# Patient Record
Sex: FEMALE | Race: OTHER | HISPANIC OR LATINO | URBAN - METROPOLITAN AREA
[De-identification: names, ages, dates, MRNs, and addresses within clinical notes are randomized per-mention and may not be internally consistent; named-entity substitution may affect disease eponyms.]

---

## 2018-03-01 ENCOUNTER — OUTPATIENT (OUTPATIENT)
Dept: OUTPATIENT SERVICES | Facility: HOSPITAL | Age: 48
LOS: 1 days | Discharge: HOME | End: 2018-03-01

## 2018-03-05 DIAGNOSIS — K02.63 DENTAL CARIES ON SMOOTH SURFACE PENETRATING INTO PULP: ICD-10-CM

## 2020-05-10 ENCOUNTER — EMERGENCY (EMERGENCY)
Facility: HOSPITAL | Age: 50
LOS: 0 days | Discharge: HOME | End: 2020-05-10
Attending: STUDENT IN AN ORGANIZED HEALTH CARE EDUCATION/TRAINING PROGRAM | Admitting: STUDENT IN AN ORGANIZED HEALTH CARE EDUCATION/TRAINING PROGRAM
Payer: SUBSIDIZED

## 2020-05-10 VITALS
DIASTOLIC BLOOD PRESSURE: 56 MMHG | OXYGEN SATURATION: 96 % | RESPIRATION RATE: 18 BRPM | TEMPERATURE: 100 F | SYSTOLIC BLOOD PRESSURE: 96 MMHG | HEART RATE: 92 BPM

## 2020-05-10 VITALS
OXYGEN SATURATION: 100 % | TEMPERATURE: 98 F | RESPIRATION RATE: 18 BRPM | HEART RATE: 114 BPM | SYSTOLIC BLOOD PRESSURE: 126 MMHG | DIASTOLIC BLOOD PRESSURE: 58 MMHG

## 2020-05-10 DIAGNOSIS — R00.0 TACHYCARDIA, UNSPECIFIED: ICD-10-CM

## 2020-05-10 DIAGNOSIS — R10.9 UNSPECIFIED ABDOMINAL PAIN: ICD-10-CM

## 2020-05-10 DIAGNOSIS — Z87.891 PERSONAL HISTORY OF NICOTINE DEPENDENCE: ICD-10-CM

## 2020-05-10 DIAGNOSIS — R51 HEADACHE: ICD-10-CM

## 2020-05-10 DIAGNOSIS — N12 TUBULO-INTERSTITIAL NEPHRITIS, NOT SPECIFIED AS ACUTE OR CHRONIC: ICD-10-CM

## 2020-05-10 LAB
ALBUMIN SERPL ELPH-MCNC: 4.4 G/DL — SIGNIFICANT CHANGE UP (ref 3.5–5.2)
ALP SERPL-CCNC: 70 U/L — SIGNIFICANT CHANGE UP (ref 30–115)
ALT FLD-CCNC: 33 U/L — SIGNIFICANT CHANGE UP (ref 0–41)
ANION GAP SERPL CALC-SCNC: 16 MMOL/L — HIGH (ref 7–14)
ANISOCYTOSIS BLD QL: SIGNIFICANT CHANGE UP
APPEARANCE UR: CLEAR — SIGNIFICANT CHANGE UP
AST SERPL-CCNC: 49 U/L — HIGH (ref 0–41)
BACTERIA # UR AUTO: ABNORMAL
BASE EXCESS BLDV CALC-SCNC: -0.4 MMOL/L — SIGNIFICANT CHANGE UP (ref -2–2)
BASOPHILS # BLD AUTO: 0 K/UL — SIGNIFICANT CHANGE UP (ref 0–0.2)
BASOPHILS NFR BLD AUTO: 0 % — SIGNIFICANT CHANGE UP (ref 0–1)
BILIRUB SERPL-MCNC: 0.7 MG/DL — SIGNIFICANT CHANGE UP (ref 0.2–1.2)
BILIRUB UR-MCNC: NEGATIVE — SIGNIFICANT CHANGE UP
BUN SERPL-MCNC: 12 MG/DL — SIGNIFICANT CHANGE UP (ref 10–20)
CA-I SERPL-SCNC: 1.09 MMOL/L — LOW (ref 1.12–1.3)
CALCIUM SERPL-MCNC: 8.9 MG/DL — SIGNIFICANT CHANGE UP (ref 8.5–10.1)
CHLORIDE SERPL-SCNC: 94 MMOL/L — LOW (ref 98–110)
CO2 SERPL-SCNC: 22 MMOL/L — SIGNIFICANT CHANGE UP (ref 17–32)
COLOR SPEC: SIGNIFICANT CHANGE UP
CREAT SERPL-MCNC: 0.6 MG/DL — LOW (ref 0.7–1.5)
DIFF PNL FLD: SIGNIFICANT CHANGE UP
EOSINOPHIL # BLD AUTO: 0 K/UL — SIGNIFICANT CHANGE UP (ref 0–0.7)
EOSINOPHIL NFR BLD AUTO: 0 % — SIGNIFICANT CHANGE UP (ref 0–8)
EPI CELLS # UR: 1 /HPF — SIGNIFICANT CHANGE UP (ref 0–5)
GAS PNL BLDV: 135 MMOL/L — LOW (ref 136–145)
GAS PNL BLDV: SIGNIFICANT CHANGE UP
GIANT PLATELETS BLD QL SMEAR: PRESENT — SIGNIFICANT CHANGE UP
GLUCOSE SERPL-MCNC: 187 MG/DL — HIGH (ref 70–99)
GLUCOSE UR QL: NEGATIVE — SIGNIFICANT CHANGE UP
HCG SERPL QL: NEGATIVE — SIGNIFICANT CHANGE UP
HCO3 BLDV-SCNC: 24 MMOL/L — SIGNIFICANT CHANGE UP (ref 22–29)
HCT VFR BLD CALC: 28.4 % — LOW (ref 37–47)
HCT VFR BLDA CALC: 21.1 % — LOW (ref 34–44)
HGB BLD CALC-MCNC: 6.9 G/DL — LOW (ref 14–18)
HGB BLD-MCNC: 8 G/DL — LOW (ref 12–16)
HYALINE CASTS # UR AUTO: 1 /LPF — SIGNIFICANT CHANGE UP (ref 0–7)
HYPOCHROMIA BLD QL: SLIGHT — SIGNIFICANT CHANGE UP
KETONES UR-MCNC: ABNORMAL
LACTATE BLDV-MCNC: 1 MMOL/L — SIGNIFICANT CHANGE UP (ref 0.5–1.6)
LACTATE SERPL-SCNC: 2.3 MMOL/L — HIGH (ref 0.7–2)
LEUKOCYTE ESTERASE UR-ACNC: ABNORMAL
LYMPHOCYTES # BLD AUTO: 0.52 K/UL — LOW (ref 1.2–3.4)
LYMPHOCYTES # BLD AUTO: 7 % — LOW (ref 20.5–51.1)
MANUAL SMEAR VERIFICATION: SIGNIFICANT CHANGE UP
MCHC RBC-ENTMCNC: 18.2 PG — LOW (ref 27–31)
MCHC RBC-ENTMCNC: 28.2 G/DL — LOW (ref 32–37)
MCV RBC AUTO: 64.7 FL — LOW (ref 81–99)
MICROCYTES BLD QL: SIGNIFICANT CHANGE UP
MONOCYTES # BLD AUTO: 0.32 K/UL — SIGNIFICANT CHANGE UP (ref 0.1–0.6)
MONOCYTES NFR BLD AUTO: 4.3 % — SIGNIFICANT CHANGE UP (ref 1.7–9.3)
NEUTROPHILS # BLD AUTO: 6.52 K/UL — HIGH (ref 1.4–6.5)
NEUTROPHILS NFR BLD AUTO: 80.9 % — HIGH (ref 42.2–75.2)
NEUTS BAND # BLD: 6.1 % — HIGH (ref 0–6)
NITRITE UR-MCNC: POSITIVE
NRBC # BLD: 1 /100 — HIGH (ref 0–0)
NRBC # BLD: SIGNIFICANT CHANGE UP /100 WBCS (ref 0–0)
PCO2 BLDV: 35 MMHG — LOW (ref 41–51)
PH BLDV: 7.44 — HIGH (ref 7.26–7.43)
PH UR: 7 — SIGNIFICANT CHANGE UP (ref 5–8)
PLAT MORPH BLD: ABNORMAL
PLATELET # BLD AUTO: 300 K/UL — SIGNIFICANT CHANGE UP (ref 130–400)
PO2 BLDV: 28 MMHG — SIGNIFICANT CHANGE UP (ref 20–40)
POIKILOCYTOSIS BLD QL AUTO: SIGNIFICANT CHANGE UP
POLYCHROMASIA BLD QL SMEAR: SLIGHT — SIGNIFICANT CHANGE UP
POTASSIUM BLDV-SCNC: 3.2 MMOL/L — LOW (ref 3.3–5.6)
POTASSIUM SERPL-MCNC: 3.5 MMOL/L — SIGNIFICANT CHANGE UP (ref 3.5–5)
POTASSIUM SERPL-SCNC: 3.5 MMOL/L — SIGNIFICANT CHANGE UP (ref 3.5–5)
PROT SERPL-MCNC: 8 G/DL — SIGNIFICANT CHANGE UP (ref 6–8)
PROT UR-MCNC: ABNORMAL
RBC # BLD: 4.39 M/UL — SIGNIFICANT CHANGE UP (ref 4.2–5.4)
RBC # FLD: 20.7 % — HIGH (ref 11.5–14.5)
RBC BLD AUTO: ABNORMAL
RBC CASTS # UR COMP ASSIST: 3 /HPF — SIGNIFICANT CHANGE UP (ref 0–4)
SAO2 % BLDV: 52 % — SIGNIFICANT CHANGE UP
SODIUM SERPL-SCNC: 132 MMOL/L — LOW (ref 135–146)
SP GR SPEC: >1.05 (ref 1.01–1.02)
UROBILINOGEN FLD QL: SIGNIFICANT CHANGE UP
VARIANT LYMPHS # BLD: 1.7 % — SIGNIFICANT CHANGE UP (ref 0–5)
WBC # BLD: 7.49 K/UL — SIGNIFICANT CHANGE UP (ref 4.8–10.8)
WBC # FLD AUTO: 7.49 K/UL — SIGNIFICANT CHANGE UP (ref 4.8–10.8)
WBC UR QL: 40 /HPF — HIGH (ref 0–5)

## 2020-05-10 PROCEDURE — 74177 CT ABD & PELVIS W/CONTRAST: CPT | Mod: 26

## 2020-05-10 PROCEDURE — 99285 EMERGENCY DEPT VISIT HI MDM: CPT

## 2020-05-10 PROCEDURE — 93010 ELECTROCARDIOGRAM REPORT: CPT

## 2020-05-10 RX ORDER — ONDANSETRON 8 MG/1
4 TABLET, FILM COATED ORAL ONCE
Refills: 0 | Status: COMPLETED | OUTPATIENT
Start: 2020-05-10 | End: 2020-05-10

## 2020-05-10 RX ORDER — CEFTRIAXONE 500 MG/1
1000 INJECTION, POWDER, FOR SOLUTION INTRAMUSCULAR; INTRAVENOUS ONCE
Refills: 0 | Status: COMPLETED | OUTPATIENT
Start: 2020-05-10 | End: 2020-05-10

## 2020-05-10 RX ORDER — KETOROLAC TROMETHAMINE 30 MG/ML
15 SYRINGE (ML) INJECTION ONCE
Refills: 0 | Status: DISCONTINUED | OUTPATIENT
Start: 2020-05-10 | End: 2020-05-10

## 2020-05-10 RX ORDER — ACETAMINOPHEN 500 MG
650 TABLET ORAL ONCE
Refills: 0 | Status: COMPLETED | OUTPATIENT
Start: 2020-05-10 | End: 2020-05-10

## 2020-05-10 RX ORDER — CEFPODOXIME PROXETIL 100 MG
1 TABLET ORAL
Qty: 28 | Refills: 0
Start: 2020-05-10 | End: 2020-05-23

## 2020-05-10 RX ORDER — SODIUM CHLORIDE 9 MG/ML
1500 INJECTION, SOLUTION INTRAVENOUS ONCE
Refills: 0 | Status: COMPLETED | OUTPATIENT
Start: 2020-05-10 | End: 2020-05-10

## 2020-05-10 RX ORDER — SODIUM CHLORIDE 9 MG/ML
1000 INJECTION, SOLUTION INTRAVENOUS ONCE
Refills: 0 | Status: COMPLETED | OUTPATIENT
Start: 2020-05-10 | End: 2020-05-10

## 2020-05-10 RX ADMIN — ONDANSETRON 4 MILLIGRAM(S): 8 TABLET, FILM COATED ORAL at 14:27

## 2020-05-10 RX ADMIN — SODIUM CHLORIDE 1500 MILLILITER(S): 9 INJECTION, SOLUTION INTRAVENOUS at 15:53

## 2020-05-10 RX ADMIN — Medication 650 MILLIGRAM(S): at 15:54

## 2020-05-10 RX ADMIN — CEFTRIAXONE 100 MILLIGRAM(S): 500 INJECTION, POWDER, FOR SOLUTION INTRAMUSCULAR; INTRAVENOUS at 14:26

## 2020-05-10 RX ADMIN — SODIUM CHLORIDE 1000 MILLILITER(S): 9 INJECTION, SOLUTION INTRAVENOUS at 14:27

## 2020-05-10 RX ADMIN — Medication 15 MILLIGRAM(S): at 14:25

## 2020-05-10 NOTE — ED PROVIDER NOTE - ATTENDING CONTRIBUTION TO CARE
49 y./o F   No reported pmh  p/w L flank pain w/ rad to LLQ x 3d  + fever, chills, nausea, dysuria, frequency.  No cough, cp.  ROS PE above.  IMP: Renal stone vs uti/ pyelo  P: labs, IVF, abx, CT reassess.

## 2020-05-10 NOTE — ED ADULT NURSE NOTE - NSIMPLEMENTINTERV_GEN_ALL_ED
Implemented All Universal Safety Interventions:  [place] to call system. Call bell, personal items and telephone within reach. Instruct patient to call for assistance. Room bathroom lighting operational. Non-slip footwear when patient is off stretcher. Physically safe environment: no spills, clutter or unnecessary equipment. Stretcher in lowest position, wheels locked, appropriate side rails in place.

## 2020-05-10 NOTE — ED PROVIDER NOTE - NS ED ROS FT
GEN:  + fever, + chills  NEURO:  + headache, no dizziness  ENT: no sore throat, no runny nose  CV:  no chest pain, no palpitations  RESP:  no sob, no cough  GI:  + nausea, no vomiting, + abdominal pain, no diarrhea  :  + dysuria, + urinary frequency, + hematuria  MSK:  no joint pain, no edema  SKIN:  no rash, no bruising  HEME: no hematochezia, no melena 31 yo male with atypical CP yesterday, asymptomatic here , placed in EDOU for cardiac work up, stress test and troponin negative.

## 2020-05-10 NOTE — ED ADULT NURSE NOTE - CHIEF COMPLAINT QUOTE
Abdominal pain, painful urination, difficulty urinating, fever x 3 days. Denies SOB and cough. Pt is hard of hearing, Omani speaking, daughter phone number is [phone] (Radha Olvera).

## 2020-05-10 NOTE — ED PROVIDER NOTE - NSFOLLOWUPCLINICS_GEN_ALL_ED_FT
[facility]  Medicine  [address], [place]   Phone: [phone]  Fax:   Follow Up Time:

## 2020-05-10 NOTE — ED PROVIDER NOTE - OBJECTIVE STATEMENT
48 yo F with no PMHx who presents with gradual worsening of constant, severe, L lower flank pain radiating to LLQ x 3 days associated with fever Tmax 100.9, chills, nausea, dysuria, urinary frequency. Sx transiently improved with tylenol, last taken 4 hrs prior to arrival. Had small drops of blood in urine which resolved on its own. No vomiting, cp, sob, diarrhea. LMP 5 days ago.

## 2020-05-10 NOTE — ED PROVIDER NOTE - CLINICAL SUMMARY MEDICAL DECISION MAKING FREE TEXT BOX
Pt w/ fever/ chills abd pain urinary sx. labs CT reviewed, c/w pyelonephritis. pain resolved. pt felt much better. got abx ivf. enrolled for telehealth f/up. Discussed all available results.  Pt and/ or family understand results, plan of care, abx use, telehealth follow up as discussed, and signs and symptoms for ED return.  Pt/ family is comfortable with discharge. dc home.

## 2020-05-10 NOTE — ED ADULT TRIAGE NOTE - CHIEF COMPLAINT QUOTE
Abdominal pain, painful urination, difficulty urinating, fever x 3 days. Denies SOB and cough. Pt is hard of hearing, Sammarinese speaking, daughter phone number is [phone] (Radha Olvera).

## 2020-05-10 NOTE — ED PROVIDER NOTE - NSFOLLOWUPINSTRUCTIONS_ED_ALL_ED_FT
Infección del riñón    LO QUE NECESITA SABER:    ¿Qué es zacarias infección del riñón?Zacarias infección de riñón, o pielonefritis es zacarias infección bacteriana. La infección usualmente comienza en roque vejiga o uretra y se pasa a roque riñón. Lian o ambos riñones podrían estar infectados. Riñón, uréteres, vejiga         ¿Qué aumenta mi riesgo de contraer zacarias infección de riñón?    Un historial de infecciones del tracto urinario      Zacarias sonda vesical permanente      Flujo de orina obstruido u orina que fluye de regreso de roque uretra      Embarazo      Condiciones médicas ivone la diabetes o piedras en los riñones    ¿Cuáles son los signos y síntomas de zacarias infección de riñón?    Dolor en el abdomen, en la parte inferior de la espalda o en los costados      Dolor o ardor al orinar      Impulso repentino y jose de orinar u orinar con más frecuencia de lo normal      Orina turbia o con jasiel      Fiebre, escalofríos y fatiga      Náuseas y vómitos    ¿Cómo se diagnostica zacarias infección de riñón?Roque médico le preguntará acerca de renea síntomas. Él también le preguntará si usted tiene otras condiciones de deb. Los exámenes de jasiel y de orina mostrarán roque infección. Podrían realizarle un ultrasonido para mostrar zacarias infección, un absceso u otros problemas en renea riñones.    ¿Cómo se trata zacarias infección del riñón?    Los antibióticostratan roque infección bacteriana.      Acetaminofénalivia el dolor y baja la fiebre. Está disponible sin receta médica. Pregunte la cantidad y la frecuencia con que debe tomarlos. Siga las indicaciones. Colleen las etiquetas de todos los demás medicamentos que esté usando para saber si también contienen acetaminofén, o pregunte a roque médico o farmacéutico. El acetaminofén puede causar daño en el hígado cuando no se david de forma correcta. No use más de 4 gramos (4000 miligramos) en total de acetaminofeno en un día.      Los ROD,ivone el ibuprofeno, ayudan a disminuir la inflamación, el dolor y la fiebre. Bella medicamento está disponible con o sin zacarias receta médica. Los ROD pueden causar sangrado estomacal o problemas renales en ciertas personas. Si usted esta tomando un anticoágulante, siempre pregunte si los AINEs son seguros para usted. Siempre colleen la etiqueta de bella medicamento y siga las instrucciones. No administre bella medicamento a niños menores de 6 meses de pierre sin antes obtener la autorización de roque médico.      Puede administrarsepodrían administrarse. Pregunte cómo francois estos medicamentos de zacarias forma eric.      La cirugíapodría ser necesaria si lian de los uréteres está obstruido. Los uréteres son los conductos que llevan la orina de los riñones a la vejiga. Si lian de los uréteres está bloqueado, podría provocar infecciones recurrentes en los riñones.    ¿Cómo puedo controlar los síntomas?    [place] líquidos ivone se le haya indicado.Es posible que usted necesite francois líquidos adicionales para ayudar a limpiar renea riñones y roque sistema urinario. El agua es el mejor líquido para francois. Pregunte a roque médico sobre la cantidad de líquido que necesita francois todos los yevgeniy y cuáles le recomienda.      Orine neves pronto ivone sienta la necesidad de hacerlo.[place] ayudará a eliminar las bacterias de roque sistema urinario. No espere ni aguante roque orina por demasiado tiempo.      Limpie el área de los genitales a diario con agua y jabón.Límpiese de adelante hacia atrás después de orinar o de tener zacarias evacuación intestinal. Use ropa interior de algodón. Las telas ivone el nylon y el poliéster pueden permanecer húmedas. [place] puede aumentar roque riesgo de zacarias infección. Orine dentro de 15 minutos después de caterina tenido relaciones sexuales.    ¿Cuándo seelna buscar atención inmediata?    Usted tiene fiebre y escalofríos.      Usted no puede dejar de vomitar.      Usted tiene dolor intenso en el abdomen, en la parte inferior de la espalda o en los costados.    ¿Cuándo selena comunicarme con mi médico?    Usted continúa teniendo fiebre después de francois los antibióticos por 3 días.      Usted siente dolor al orinar, incluso después del tratamiento.      Renea signos y síntomas regresan.      Usted tiene preguntas o inquietudes acerca de roque condición o cuidado.    ACUERDOS SOBRE ROQUE CUIDADO:    Usted tiene el derecho de ayudar a planear roque cuidado. Aprenda todo lo que pueda sobre roque condición y ivone darle tratamiento. Discuta renea opciones de tratamiento con renea médicos para decidir el cuidado que usted desea recibir. Usted siempre tiene el derecho de rechazar el tratamiento.       © Copyright [employer] 2020

## 2020-05-10 NOTE — ED PROVIDER NOTE - PATIENT PORTAL LINK FT
You can access the FollowMyHealth Patient Portal offered by [facility] by registering at the following website: [email or web address]. By joining [employer]’s FollowMyHealth portal, you will also be able to view your health information using other applications (apps) compatible with our system.

## 2020-05-10 NOTE — ED ADULT NURSE NOTE - OBJECTIVE STATEMENT
Pt with C/O  pain on urination left side abdomen pain for 3 days worse today N/V on and offf ,fever ,pt denies diarrhea or constipation

## 2020-05-10 NOTE — ED PROVIDER NOTE - PROGRESS NOTE DETAILS
TC: 50 yo F TC: Previously healthy 50 yo F who presents with L flank pain and urinary sx x 3 days. Had fever, chills, nausea at home. Here in ED, tachy 110s, oral temp wnl. Abd nontender on exam. Ordered labs, ekg, urine, CT abd. Given IVF, ceftriaxone, tylenol. Will reassess. TC: Previously healthy 50 yo F who presents with L flank pain and urinary sx x 3 days. Had fever, chills, nausea at home. Here in ED, tachy 110s, oral temp wnl, will check rectal temp Abd nontender on exam. Ordered labs, ekg, urine, CT abd. Given IVF, ceftriaxone, tylenol. Will reassess. TC: Reassessed pt, reports pain improved after toradol. Labs notable for hgb 8 (unknown baseline), lactate 2.3. Pending urine sample, CT, repeat lactate. TC: Repeat lactate improved to 1. Pt taken to CT. Pt feels better. TC: Pt reports pain improved. HR improved. BP stable. CT shows large fibroid mass with mass effect on bladder. No evidence of hydro or nephrolithiasis. UA grossly positive. Rx for vantin sent to pharmacy. Pt enrolled into telehealth f/u. Strict ED return precautions given. Pt verbalized understanding and was agreeable with plan. TC: Pt reports pain improved. HR improved. BP stable. CT shows large fibroid mass with mass effect on bladder. No evidence of hydro or nephrolithiasis. UA grossly positive. Rx for vantin sent to pharmacy. Pt enrolled into telehealth f/u. Given medicine clinic f/u. Strict ED return precautions given. Pt verbalized understanding and was agreeable with plan.

## 2020-05-12 LAB
CULTURE RESULTS: SIGNIFICANT CHANGE UP
SPECIMEN SOURCE: SIGNIFICANT CHANGE UP

## 2020-05-15 LAB
CULTURE RESULTS: SIGNIFICANT CHANGE UP
SPECIMEN SOURCE: SIGNIFICANT CHANGE UP

## 2020-09-09 ENCOUNTER — INPATIENT (INPATIENT)
Facility: HOSPITAL | Age: 50
LOS: 3 days | Discharge: HOME | End: 2020-09-13
Attending: INTERNAL MEDICINE | Admitting: INTERNAL MEDICINE
Payer: MEDICAID

## 2020-09-09 VITALS
RESPIRATION RATE: 18 BRPM | WEIGHT: 119.93 LBS | TEMPERATURE: 99 F | HEART RATE: 91 BPM | HEIGHT: 61 IN | SYSTOLIC BLOOD PRESSURE: 118 MMHG | DIASTOLIC BLOOD PRESSURE: 75 MMHG

## 2020-09-09 DIAGNOSIS — I26.99 OTHER PULMONARY EMBOLISM WITHOUT ACUTE COR PULMONALE: ICD-10-CM

## 2020-09-09 DIAGNOSIS — R09.89 OTHER SPECIFIED SYMPTOMS AND SIGNS INVOLVING THE CIRCULATORY AND RESPIRATORY SYSTEMS: ICD-10-CM

## 2020-09-09 LAB
ALBUMIN SERPL ELPH-MCNC: 4.2 G/DL — SIGNIFICANT CHANGE UP (ref 3.5–5.2)
ALP SERPL-CCNC: 86 U/L — SIGNIFICANT CHANGE UP (ref 30–115)
ALT FLD-CCNC: 27 U/L — SIGNIFICANT CHANGE UP (ref 0–41)
ANION GAP SERPL CALC-SCNC: 14 MMOL/L — SIGNIFICANT CHANGE UP (ref 7–14)
ANISOCYTOSIS BLD QL: SIGNIFICANT CHANGE UP
APPEARANCE UR: CLEAR — SIGNIFICANT CHANGE UP
APTT BLD: 25.3 SEC — LOW (ref 27–39.2)
APTT BLD: SIGNIFICANT CHANGE UP SEC (ref 27–39.2)
AST SERPL-CCNC: 59 U/L — HIGH (ref 0–41)
BACTERIA # UR AUTO: ABNORMAL
BASOPHILS # BLD AUTO: 0 K/UL — SIGNIFICANT CHANGE UP (ref 0–0.2)
BASOPHILS NFR BLD AUTO: 0 % — SIGNIFICANT CHANGE UP (ref 0–1)
BILIRUB SERPL-MCNC: 0.5 MG/DL — SIGNIFICANT CHANGE UP (ref 0.2–1.2)
BILIRUB UR-MCNC: NEGATIVE — SIGNIFICANT CHANGE UP
BLD GP AB SCN SERPL QL: SIGNIFICANT CHANGE UP
BUN SERPL-MCNC: 9 MG/DL — LOW (ref 10–20)
CALCIUM SERPL-MCNC: 9.4 MG/DL — SIGNIFICANT CHANGE UP (ref 8.5–10.1)
CHLORIDE SERPL-SCNC: 94 MMOL/L — LOW (ref 98–110)
CO2 SERPL-SCNC: 23 MMOL/L — SIGNIFICANT CHANGE UP (ref 17–32)
COLOR SPEC: SIGNIFICANT CHANGE UP
CREAT SERPL-MCNC: 0.6 MG/DL — LOW (ref 0.7–1.5)
DIFF PNL FLD: NEGATIVE — SIGNIFICANT CHANGE UP
EOSINOPHIL # BLD AUTO: 0.15 K/UL — SIGNIFICANT CHANGE UP (ref 0–0.7)
EOSINOPHIL NFR BLD AUTO: 2.6 % — SIGNIFICANT CHANGE UP (ref 0–8)
EPI CELLS # UR: 1 /HPF — SIGNIFICANT CHANGE UP (ref 0–5)
GIANT PLATELETS BLD QL SMEAR: PRESENT — SIGNIFICANT CHANGE UP
GLUCOSE SERPL-MCNC: 472 MG/DL — CRITICAL HIGH (ref 70–99)
GLUCOSE UR QL: ABNORMAL
HCG SERPL QL: NEGATIVE — SIGNIFICANT CHANGE UP
HCT VFR BLD CALC: 28.9 % — LOW (ref 37–47)
HGB BLD-MCNC: 8 G/DL — LOW (ref 12–16)
HYALINE CASTS # UR AUTO: 0 /LPF — SIGNIFICANT CHANGE UP (ref 0–7)
INR BLD: 1.1 RATIO — SIGNIFICANT CHANGE UP (ref 0.65–1.3)
INR BLD: 1.15 RATIO — SIGNIFICANT CHANGE UP (ref 0.65–1.3)
KETONES UR-MCNC: SIGNIFICANT CHANGE UP
LACTATE SERPL-SCNC: 1.5 MMOL/L — SIGNIFICANT CHANGE UP (ref 0.7–2)
LACTATE SERPL-SCNC: 2.9 MMOL/L — HIGH (ref 0.7–2)
LEUKOCYTE ESTERASE UR-ACNC: NEGATIVE — SIGNIFICANT CHANGE UP
LIDOCAIN IGE QN: 35 U/L — SIGNIFICANT CHANGE UP (ref 7–60)
LYMPHOCYTES # BLD AUTO: 1.03 K/UL — LOW (ref 1.2–3.4)
LYMPHOCYTES # BLD AUTO: 17.4 % — LOW (ref 20.5–51.1)
MAGNESIUM SERPL-MCNC: 2.1 MG/DL — SIGNIFICANT CHANGE UP (ref 1.8–2.4)
MANUAL SMEAR VERIFICATION: SIGNIFICANT CHANGE UP
MCHC RBC-ENTMCNC: 19.4 PG — LOW (ref 27–31)
MCHC RBC-ENTMCNC: 27.7 G/DL — LOW (ref 32–37)
MCV RBC AUTO: 70.1 FL — LOW (ref 81–99)
MICROCYTES BLD QL: SIGNIFICANT CHANGE UP
MONOCYTES # BLD AUTO: 0.41 K/UL — SIGNIFICANT CHANGE UP (ref 0.1–0.6)
MONOCYTES NFR BLD AUTO: 6.9 % — SIGNIFICANT CHANGE UP (ref 1.7–9.3)
MYELOCYTES NFR BLD: 0.9 % — HIGH (ref 0–0)
NEUTROPHILS # BLD AUTO: 4.11 K/UL — SIGNIFICANT CHANGE UP (ref 1.4–6.5)
NEUTROPHILS NFR BLD AUTO: 69.6 % — SIGNIFICANT CHANGE UP (ref 42.2–75.2)
NITRITE UR-MCNC: POSITIVE
NRBC # BLD: 1 /100 — HIGH (ref 0–0)
NRBC # BLD: SIGNIFICANT CHANGE UP /100 WBCS (ref 0–0)
NT-PROBNP SERPL-SCNC: 45 PG/ML — SIGNIFICANT CHANGE UP (ref 0–300)
PH UR: 6.5 — SIGNIFICANT CHANGE UP (ref 5–8)
PLAT MORPH BLD: NORMAL — SIGNIFICANT CHANGE UP
PLATELET # BLD AUTO: 288 K/UL — SIGNIFICANT CHANGE UP (ref 130–400)
POIKILOCYTOSIS BLD QL AUTO: SLIGHT — SIGNIFICANT CHANGE UP
POLYCHROMASIA BLD QL SMEAR: SLIGHT — SIGNIFICANT CHANGE UP
POTASSIUM SERPL-MCNC: 4.9 MMOL/L — SIGNIFICANT CHANGE UP (ref 3.5–5)
POTASSIUM SERPL-SCNC: 4.9 MMOL/L — SIGNIFICANT CHANGE UP (ref 3.5–5)
PROT SERPL-MCNC: 7.7 G/DL — SIGNIFICANT CHANGE UP (ref 6–8)
PROT UR-MCNC: NEGATIVE — SIGNIFICANT CHANGE UP
PROTHROM AB SERPL-ACNC: 12.7 SEC — SIGNIFICANT CHANGE UP (ref 9.95–12.87)
PROTHROM AB SERPL-ACNC: 13.2 SEC — HIGH (ref 9.95–12.87)
RBC # BLD: 4.12 M/UL — LOW (ref 4.2–5.4)
RBC # FLD: 24.9 % — HIGH (ref 11.5–14.5)
RBC BLD AUTO: ABNORMAL
RBC CASTS # UR COMP ASSIST: 1 /HPF — SIGNIFICANT CHANGE UP (ref 0–4)
SODIUM SERPL-SCNC: 131 MMOL/L — LOW (ref 135–146)
SP GR SPEC: 1.03 — HIGH (ref 1.01–1.02)
TROPONIN T SERPL-MCNC: <0.01 NG/ML — SIGNIFICANT CHANGE UP
UROBILINOGEN FLD QL: SIGNIFICANT CHANGE UP
VARIANT LYMPHS # BLD: 2.6 % — SIGNIFICANT CHANGE UP (ref 0–5)
WBC # BLD: 5.91 K/UL — SIGNIFICANT CHANGE UP (ref 4.8–10.8)
WBC # FLD AUTO: 5.91 K/UL — SIGNIFICANT CHANGE UP (ref 4.8–10.8)
WBC UR QL: 3 /HPF — SIGNIFICANT CHANGE UP (ref 0–5)

## 2020-09-09 PROCEDURE — 71045 X-RAY EXAM CHEST 1 VIEW: CPT | Mod: 26

## 2020-09-09 PROCEDURE — 93010 ELECTROCARDIOGRAM REPORT: CPT

## 2020-09-09 PROCEDURE — 74177 CT ABD & PELVIS W/CONTRAST: CPT | Mod: 26

## 2020-09-09 PROCEDURE — 99285 EMERGENCY DEPT VISIT HI MDM: CPT

## 2020-09-09 PROCEDURE — 99222 1ST HOSP IP/OBS MODERATE 55: CPT | Mod: AI,GC

## 2020-09-09 RX ORDER — ENOXAPARIN SODIUM 100 MG/ML
60 INJECTION SUBCUTANEOUS ONCE
Refills: 0 | Status: COMPLETED | OUTPATIENT
Start: 2020-09-09 | End: 2020-09-09

## 2020-09-09 RX ORDER — CIPROFLOXACIN LACTATE 400MG/40ML
400 VIAL (ML) INTRAVENOUS EVERY 12 HOURS
Refills: 0 | Status: DISCONTINUED | OUTPATIENT
Start: 2020-09-09 | End: 2020-09-11

## 2020-09-09 RX ORDER — CIPROFLOXACIN LACTATE 400MG/40ML
400 VIAL (ML) INTRAVENOUS ONCE
Refills: 0 | Status: COMPLETED | OUTPATIENT
Start: 2020-09-09 | End: 2020-09-09

## 2020-09-09 RX ORDER — METRONIDAZOLE 500 MG
TABLET ORAL
Refills: 0 | Status: DISCONTINUED | OUTPATIENT
Start: 2020-09-10 | End: 2020-09-11

## 2020-09-09 RX ORDER — METRONIDAZOLE 500 MG
500 TABLET ORAL EVERY 8 HOURS
Refills: 0 | Status: DISCONTINUED | OUTPATIENT
Start: 2020-09-10 | End: 2020-09-11

## 2020-09-09 RX ORDER — ENOXAPARIN SODIUM 100 MG/ML
60 INJECTION SUBCUTANEOUS
Refills: 0 | Status: DISCONTINUED | OUTPATIENT
Start: 2020-09-09 | End: 2020-09-11

## 2020-09-09 RX ORDER — METRONIDAZOLE 500 MG
500 TABLET ORAL ONCE
Refills: 0 | Status: COMPLETED | OUTPATIENT
Start: 2020-09-09 | End: 2020-09-10

## 2020-09-09 RX ORDER — CHLORHEXIDINE GLUCONATE 213 G/1000ML
1 SOLUTION TOPICAL
Refills: 0 | Status: DISCONTINUED | OUTPATIENT
Start: 2020-09-09 | End: 2020-09-13

## 2020-09-09 RX ORDER — CEFTRIAXONE 500 MG/1
1000 INJECTION, POWDER, FOR SOLUTION INTRAMUSCULAR; INTRAVENOUS ONCE
Refills: 0 | Status: COMPLETED | OUTPATIENT
Start: 2020-09-09 | End: 2020-09-09

## 2020-09-09 RX ORDER — SODIUM CHLORIDE 9 MG/ML
1000 INJECTION, SOLUTION INTRAVENOUS ONCE
Refills: 0 | Status: COMPLETED | OUTPATIENT
Start: 2020-09-09 | End: 2020-09-09

## 2020-09-09 RX ORDER — MORPHINE SULFATE 50 MG/1
4 CAPSULE, EXTENDED RELEASE ORAL ONCE
Refills: 0 | Status: DISCONTINUED | OUTPATIENT
Start: 2020-09-09 | End: 2020-09-09

## 2020-09-09 RX ADMIN — MORPHINE SULFATE 4 MILLIGRAM(S): 50 CAPSULE, EXTENDED RELEASE ORAL at 19:56

## 2020-09-09 RX ADMIN — MORPHINE SULFATE 4 MILLIGRAM(S): 50 CAPSULE, EXTENDED RELEASE ORAL at 16:58

## 2020-09-09 RX ADMIN — SODIUM CHLORIDE 1000 MILLILITER(S): 9 INJECTION, SOLUTION INTRAVENOUS at 17:00

## 2020-09-09 RX ADMIN — ENOXAPARIN SODIUM 60 MILLIGRAM(S): 100 INJECTION SUBCUTANEOUS at 23:09

## 2020-09-09 RX ADMIN — SODIUM CHLORIDE 1000 MILLILITER(S): 9 INJECTION, SOLUTION INTRAVENOUS at 16:58

## 2020-09-09 RX ADMIN — Medication 200 MILLIGRAM(S): at 22:12

## 2020-09-09 RX ADMIN — CEFTRIAXONE 100 MILLIGRAM(S): 500 INJECTION, POWDER, FOR SOLUTION INTRAMUSCULAR; INTRAVENOUS at 20:27

## 2020-09-09 NOTE — ED PROVIDER NOTE - NS ED ROS FT
Constitutional: (-) fever  Eyes/ENT: (-) blurry vision, (-) epistaxis  Cardiovascular: (-) chest pain, (-) syncope  Respiratory: (-) cough, (-) shortness of breath  Gastrointestinal: (-) vomiting, (-) diarrhea  : (+) hematuria, (+) dysuria, (+) flank pain  Musculoskeletal: (-) neck pain, (-) back pain, (-) joint pain  Integumentary: (-) rash, (-) edema  Neurological: (-) headache, (-) altered mental status  Allergic/Immunologic: (-) pruritus

## 2020-09-09 NOTE — ED PROVIDER NOTE - CARE PLAN
Principal Discharge DX:	Pulmonary embolism  Secondary Diagnosis:	Pyelonephritis  Secondary Diagnosis:	Proctitis

## 2020-09-09 NOTE — H&P ADULT - ASSESSMENT
Afghan speaking, 50F w/ PMHx of Pyelonephritis presents with c/o acute onset of Lt flank pain with radiation to Lt groin.    # Lt flank pain with radiation to LLQ/groin likely 2/2 to proctitis and cystitis, Pyelonephritis less likely  # Lactic Acidosis   - sepsis not present on admission  - afebrile, no leukocytosis, UA positive  - f/u Blood and Urine cx  - start Cipro and flagyl    # Acute PE (unprovoked)  - CTAP: acute Rt lower lobe subsegmental PE, Rectal wall thickening likely proctitis and hepatic steatosis  - Trops negative x1  - No family history of hypercoagulable disorder   - start Lovenox therapeutic  - f/u CTA of chest, VA duplex  - will need hypercoagulable w/u    # DVT ppx - lovenox  # GI ppx - not indicated  # activity - IAT  # diet - soft  Full code Dominican speaking, 50F w/ PMHx of Pyelonephritis presents with c/o acute onset of Lt flank pain with radiation to Lt groin.    # Lt flank pain with radiation to LLQ/groin likely 2/2 to proctitis and cystitis, Pyelonephritis less likely  # Lactic Acidosis   - sepsis not present on admission  - afebrile, no leukocytosis, UA positive  - f/u Blood and Urine cx  - start Cipro and flagyl    # Acute PE (unprovoked)  - CTAP: acute Rt lower lobe subsegmental PE, Rectal wall thickening likely proctitis and hepatic steatosis  - Trops negative x1  - No family history of hypercoagulable disorder   - start Lovenox therapeutic  - f/u CTA of chest, VA duplex  - will need hypercoagulable w/u    # Microcytic Anemia  - will send Anemia w/u    # DVT ppx - lovenox  # GI ppx - not indicated  # activity - IAT  # diet - soft  Full code Zambian speaking, 50F w/ PMHx of Pyelonephritis presents with c/o acute onset of Lt flank pain with radiation to Lt groin.    # Lt flank pain with radiation to LLQ/groin likely 2/2 to proctitis and cystitis, Pyelonephritis less likely  # Lactic Acidosis   - sepsis not present on admission  - afebrile, no leukocytosis, UA positive  - f/u Blood and Urine cx  - start Cipro and flagyl    # Acute PE (unprovoked)  - CTAP: acute Rt lower lobe subsegmental PE, Rectal wall thickening likely proctitis and hepatic steatosis  - Trops negative x1, EKG NSR  - No family history of hypercoagulable disorder   - start Lovenox therapeutic  - f/u CTA of chest, VA duplex  - will need hypercoagulable w/u  - Telemetry    # Microcytic Anemia  - will send Anemia w/u    # DVT ppx - lovenox  # GI ppx - not indicated  # activity - IAT  # diet - soft  Full code Burkinan speaking, 50F w/ PMHx of Pyelonephritis presents with c/o acute onset of Lt flank pain with radiation to Lt groin.    # Lt flank pain with radiation to LLQ/groin likely 2/2 to proctitis and cystitis, Pyelonephritis less likely  # Lactic Acidosis   - sepsis not present on admission  - afebrile, no leukocytosis, UA positive  - f/u Blood and Urine cx  - start Cipro and flagyl    # Acute PE (unprovoked)  - CTAP: acute Rt lower lobe subsegmental PE, Rectal wall thickening likely proctitis and hepatic steatosis  - Trops negative x1, EKG NSR  - No family history of hypercoagulable disorder   - start Lovenox therapeutic  - f/u CTA of chest, VA duplex  - will need hypercoagulable w/u  - Telemetry    # Microcytic Anemia  - will send Anemia w/u    # Hepatic steatosis  - denies alcohol use  - f/u hepatitis panel and lipid panel    #Hyperglycemia on BMP  - f/u A1C, if FS >180 start basal bolus coverage    # DVT ppx - lovenox  # GI ppx - not indicated  # activity - IAT  # diet - soft  Full code

## 2020-09-09 NOTE — H&P ADULT - ATTENDING COMMENTS
HPI:  [place] Islander speaking, 50F w/ PMHx of Pyelonephritis presents with c/o acute onset of Lt flank pain with radiation to Lt groin. Left flank pain started last night, sharp in quality, 10/10 on pain scale, radiating to Lt groin, improved with tylenol, and associated with Nausea and chills. Currently she Denies HA, fatigue, fever, chills, dizziness, change in vision, runny nose, sore throat, CP, SOB, cough, wheezing, abd pain, nausea, vomiting, diarrhea, constipation, blood in stool or urine, and dysuria. Denies recent travel, sick contacts, no pets. Off note, she had Lt pyelonephritis on previous admission 5/20. CTAP at that time showed large fibroid mass with mass effect on bladder. No evidence of hydro or nephrolithiasis.     ED vitals:   /75, HR 91, RR 18, T 99f  CTAP: acute Rt lower lobe subsegmental PE, Rectal wall thickening likely proctitis and hepatic steatosis  Received Rocephin 1g IVx1, Cipro 400mg IVx1, 1L IVF bolus, and morphine (09 Sep 2020 22:51)    REVIEW OF SYSTEMS:    CONSTITUTIONAL: No weakness, fevers or chills  EYES/ENT: No visual changes;  No vertigo or throat pain   NECK: No pain or stiffness  RESPIRATORY: No cough, wheezing, hemoptysis; No shortness of breath  CARDIOVASCULAR: No chest pain or palpitations  GASTROINTESTINAL: No abdominal or epigastric pain. No nausea, vomiting, or hematemesis; No diarrhea or constipation. No melena or hematochezia.  GENITOURINARY: No dysuria, frequency or hematuria  NEUROLOGICAL: No numbness or weakness  SKIN: No itching, rashes    Physical Exam:    General: WN/WD NAD  Neurology: A&Ox3, nonfocal, follows commands  Eyes: PERRLA/ EOMI  ENT/Neck: Neck supple, trachea midline, No JVD  Respiratory: CTA B/L, No wheezing, rales, rhonchi  CV: Normal rate regular rhythm, S1S2, no murmurs, rubs or gallops  Abdominal: Soft, NT, ND +BS,   Extremities: No edema, + peripheral pulses  Skin: No Rashes, Hematoma, Ecchymosis  Incisions:   Tubes:

## 2020-09-09 NOTE — H&P ADULT - NSHPLABSRESULTS_GEN_ALL_CORE
8.0    5.91  )-----------( 288      ( 09 Sep 2020 18:05 )             28.9           131<L>  |  94<L>  |  9<L>  ----------------------------<  472<HH>  4.9   |  23  |  0.6<L>    Ca    9.4      09 Sep 2020 16:23  Mg     2.1         TPro  7.7  /  Alb  4.2  /  TBili  0.5  /  DBili  x   /  AST  59<H>  /  ALT  27  /  AlkPhos  86          Urinalysis Basic - ( 09 Sep 2020 19:25 )    Color: Light Yellow / Appearance: Clear / S.035 / pH: x  Gluc: x / Ketone: Trace  / Bili: Negative / Urobili: <2 mg/dL   Blood: x / Protein: Negative / Nitrite: Positive   Leuk Esterase: Negative / RBC: 1 /HPF / WBC 3 /HPF   Sq Epi: x / Non Sq Epi: 1 /HPF / Bacteria: Many    PT/INR - ( 09 Sep 2020 18:05 )   PT: 13.20 sec;   INR: 1.15 ratio         PTT - ( 09 Sep 2020 18:05 )  PTT:25.3 sec    Lactate Trend   @ 19:55 Lactate:1.5    @ 16:23 Lactate:2.9       CARDIAC MARKERS ( 09 Sep 2020 22:10 )  x     / <0.01 ng/mL / x     / x     / x          < from: CT Abdomen and Pelvis w/ IV Cont (20 @ 19:51) >    IMPRESSION:  1.  * Acute right lower lobe subsegmental pulmonary emboli.  2.  Questionable rectal wall thickening with mild perirectal inflammatory changes and ill-defined presacral soft tissue thickening/edema. Findings may represent proctitis in the appropriate clinical setting.  3.  Hepaticsteatosis  < end of copied text >

## 2020-09-09 NOTE — H&P ADULT - HISTORY OF PRESENT ILLNESS
Comoran speaking, 50F w/ PMHx of Pyelonephritis presents with c/o acute onset of Lt flank pain with radiation to Lt groin. Left flank pain started last night, sharp in quality, 10/10 on pain scale, radiating to Lt groin, improved with tylenol, and associated with Nausea and chills. Currently she Denies HA, fatigue, fever, chills, dizziness, change in vision, runny nose, sore throat, CP, SOB, cough, wheezing, abd pain, nausea, vomiting, diarrhea, constipation, blood in stool or urine, and dysuria. Denies recent travel, sick contacts, no pets. Off note, she had Lt pyelonephritis on previous admission 5/20. CTAP at that time showed large fibroid mass with mass effect on bladder. No evidence of hydro or nephrolithiasis.     ED vitals:   /75, HR 91, RR 18, T 99f  CTAP: acute Rt lower lobe subsegmental PE, Rectal wall thickening likely proctitis and hepatic steatosis  Received Rocephin 1g IVx1, Cipro 400mg IVx1, 1L IVF bolus, and morphine

## 2020-09-09 NOTE — ED PROVIDER NOTE - CLINICAL SUMMARY MEDICAL DECISION MAKING FREE TEXT BOX
Patient presents with left flank pain. labs, ua, ct done. Found to have UTI and proctitis. Antibiotics given. patient also found to have subsegmental pe on abdominal ct. lovenox given. ekg and cxr done. cardiac labs ordered. Patient admitted for further management.

## 2020-09-09 NOTE — ED PROVIDER NOTE - PROGRESS NOTE DETAILS
Patient found to have sub segmental PE on abdominal ct scan. Patient denies any chest pain or shortness of breath. troponin, BNP, ekg, cxr added. Will anticoagulate patient.

## 2020-09-09 NOTE — H&P ADULT - NSHPPHYSICALEXAM_GEN_ALL_CORE
Physical Therapy Daily Treatment    Visit Count: 9     Plan of Care: 12/14/2018 Through: 5/3/2019  Insurance Information: Payor: [employer]  Authorization Needed: No  Maximum Visit Limit Per Year: 40 visits per year combined all therapies  CoPay: $0  Call Ref #: Online  Referred by:  Dr. Sahni    Medical Diagnosis (from order):  Cervical disc disorder at C6-C7 level with radiculopathy [M50.123]  Treatment Diagnosis: Cervical pain symptoms with impaired posture, impaired strength, impaired range of motion, impaired scapulohumeral rhythm, impaired tissue mobility, impaired activity tolerance     Date of onset/injury: Chronic, 11/2016  Diagnosis Precautions: none  Chart reviewed at time of initial evaluation (relevant co-morbidities, allergies, tests and medications listed): MRI from 2017 showing C5-7 facet narrowing      SUBJECTIVE   Patient notes feeling better compared to last week with the additional visit. It took him about 24 hours after our second session before feeling back to normal. He has been working on starting some of the scapular strengthing   Current Pain (0-10 scale): 7 through right levator, more consistently 5-6 through cervical spine  Functional Change: increased time spent stretching and adding strengthening HEP    OBJECTIVE   Pre treatment right cervical rotation: 55 degrees  Pre treatment left cervical rotation: 55 degrees  Decreased postrual winging bilaterally at rest compared to previous session  Outflarred 2nd/3rd rib on right compared to left, tender to palpation - tolerated dry needling well to levator    Treatment   Manual Therapy:   -1st rib strain/counter strain B  -Pec major stretch B - prolonged  -Supine PT assisted Lat stretch with overpressure through lateral scapula  -Sidelying infraspinatus pin/stretch  -thoracic rotation/extension mobilization with scapular clearning  -Median nerve floss B  -IASTM rhobmoids B, increased red response through inferior border of scapula.  -Ulnar  nerve glide  -prone rib and transverse process PA mobilizations, grade I/II/III      Dry Needling:   Patient provided a handout explaining intermuscular mobilization.  Patient has read the handout and has provided verbal agreement to proceed with the intervention.    Needles inserted 9   Needles removed 9   Locations and Needle Size Bilateral UT, right levator   Treatment duration 15 minutes   Patient response Sore, but decreased tension     Therapeutic Exercise: Pec major/minor stretch with prolonged hold, scapular squeezes x 10, single arm row (rev form), ulnar nerve floss, upper trap stretch with strap for 1st rib depression  Bilateral ER with band 2x10    Skilled input: Discussed elbow pain and how elbow flexion at desk, both sitting and standing, may contribute. Patient instructed to increase ROM through desk work to increase motion and nerve mobility.    Home Program: reviewed 2/21/19 - will review at next session.  Exercise: Date issued Date DC Comments   1st rib stretch      SA Row*      Pec stretch*   In doorway   Foam rolling*   Back and hamstrings   Bilateral ER   Add back into HEP 2/14/19                     Cervical home traction*   2x day        Writer verbally educated the patient and received verbal consent from the patient on hand placement, positioning of patient, and techniques to be performed today including manual work, dry needling, instruction/review of HEP and how they are pertinent to the patient's plan of care.      Suggestions for next session as indicated: progress per plan of care, continue with dry needling and tool work to decrease muscular tension and decrease radicular symptoms. Continue to discuss plans for returning to MD if needed. Return to appointments at every 10 days schedule.    ASSESSMENT   Patient tolerated 7 day follow up well. He presents with increased levator tension right compared to left, which is contributing to a forward and rounded shoulder. Discussed postural  education in standing, and patient demonstrates understanding for positioning, although continues to have poor endurance. Noted limited tissue tension following treatment, but 2-3rd rib outflare maintains on the right. Follow up in 10 days.  Pain after treatment (patient reported, 0-10 scale): 3, sore from needling, especially through left upper trap.  Result of above outlined education: Verbalizes understanding and Demonstrates understanding    THERAPY DAILY BILLING   Insurance: [employer]. N/A    Evaluation Procedures:  No evaluation codes were used on this date of service    Timed Procedures:  Manual Therapy, 23 minutes  Therapeutic Exercise, 8 minutes    Untimed Procedures:  Dry Needling - Unlisted Physical Medicine/Rehabilitation Service Or Procedure    Total Treatment Time: 46 minutes   GENERAL: NAD, Resting in bed  HEENT: NCAT  CHEST/LUNG: Clear to auscultation bilaterally; No wheezing or rubs.   HEART: Regular rate and rhythm; No murmurs, rubs, or gallops  ABDOMEN: Bowel sounds present; Soft, TTP LLQ and Lt flank, Nondistended.   EXTREMITIES:  No clubbing, cyanosis, or edema  NERVOUS SYSTEM:  Alert & Oriented X3  MSK: FROM all 4 extremities, full and equal strength  SKIN: No rashes or lesions

## 2020-09-09 NOTE — ED PROVIDER NOTE - OBJECTIVE STATEMENT
50y F pmh pylonephritis presents with flank pain. Pt has 3 days of intermittent sharp L flank pain, no aggravating or relieving factors. Associated difficulty urinating and hematuria x1wk. Denies fever, ha, cp, sob, weakness, numbness, weight loss, n/v/d/c

## 2020-09-09 NOTE — ED ADULT TRIAGE NOTE - CHIEF COMPLAINT QUOTE
Patient reports she has had 3 days of left flank pain radiating to the left lower pelvic. Difficulty urinating x 14 days.

## 2020-09-09 NOTE — ED PROVIDER NOTE - ATTENDING CONTRIBUTION TO CARE
51 yo F pmh of pylonephritis presents with left flank pain. States that a month ago started to experience left sided pain and urinary symptoms. States that the last few days that pain worsened. + dysuria, + hematuria, + burning and dribbling sensation. Pain is in left flank radiating to the left lower abdomen. + subjective fevers and chills. no chest pain, no shortness of breath, no palpitations.     CONSTITUTIONAL: Well-developed; well-nourished; in no acute distress.   SKIN: warm, dry  HEAD: Normocephalic; atraumatic.  EYES: PERRL, EOMI, no conjunctival erythema  ENT: No nasal discharge; airway clear.  NECK: Supple; non tender.  CARD: S1, S2 normal;  Regular rate and rhythm.   RESP: No wheezes, rales or rhonchi.  ABD: soft + left sided tenderness and suprapubic tenderness, + left cva tenderness, non distended, no rebound or guarding  EXT: Normal ROM.  5/5 strength in all 4 extremities   LYMPH: No acute cervical adenopathy.  NEURO: Alert, oriented, grossly unremarkable. neurovascularly intact  PSYCH: Cooperative, appropriate.

## 2020-09-10 LAB
A1C WITH ESTIMATED AVERAGE GLUCOSE RESULT: 10.2 % — HIGH (ref 4–5.6)
ANION GAP SERPL CALC-SCNC: 11 MMOL/L — SIGNIFICANT CHANGE UP (ref 7–14)
BASOPHILS # BLD AUTO: 0.01 K/UL — SIGNIFICANT CHANGE UP (ref 0–0.2)
BASOPHILS NFR BLD AUTO: 0.2 % — SIGNIFICANT CHANGE UP (ref 0–1)
BUN SERPL-MCNC: 5 MG/DL — LOW (ref 10–20)
CALCIUM SERPL-MCNC: 8.5 MG/DL — SIGNIFICANT CHANGE UP (ref 8.5–10.1)
CHLORIDE SERPL-SCNC: 99 MMOL/L — SIGNIFICANT CHANGE UP (ref 98–110)
CHOLEST SERPL-MCNC: 151 MG/DL — SIGNIFICANT CHANGE UP (ref 100–200)
CK MB CFR SERPL CALC: <1 NG/ML — SIGNIFICANT CHANGE UP (ref 0.6–6.3)
CO2 SERPL-SCNC: 24 MMOL/L — SIGNIFICANT CHANGE UP (ref 17–32)
CREAT SERPL-MCNC: <0.5 MG/DL — LOW (ref 0.7–1.5)
D DIMER BLD IA.RAPID-MCNC: 743 NG/ML DDU — HIGH (ref 0–230)
DRVVT SCREEN TO CONFIRM RATIO: SIGNIFICANT CHANGE UP
EOSINOPHIL # BLD AUTO: 0.19 K/UL — SIGNIFICANT CHANGE UP (ref 0–0.7)
EOSINOPHIL NFR BLD AUTO: 3.8 % — SIGNIFICANT CHANGE UP (ref 0–8)
ESTIMATED AVERAGE GLUCOSE: 246 MG/DL — HIGH (ref 68–114)
FERRITIN SERPL-MCNC: 25 NG/ML — SIGNIFICANT CHANGE UP (ref 15–150)
GLUCOSE BLDC GLUCOMTR-MCNC: 255 MG/DL — HIGH (ref 70–99)
GLUCOSE BLDC GLUCOMTR-MCNC: 298 MG/DL — HIGH (ref 70–99)
GLUCOSE BLDC GLUCOMTR-MCNC: 303 MG/DL — HIGH (ref 70–99)
GLUCOSE BLDC GLUCOMTR-MCNC: 322 MG/DL — HIGH (ref 70–99)
GLUCOSE BLDC GLUCOMTR-MCNC: 330 MG/DL — HIGH (ref 70–99)
GLUCOSE BLDC GLUCOMTR-MCNC: 332 MG/DL — HIGH (ref 70–99)
GLUCOSE BLDC GLUCOMTR-MCNC: 78 MG/DL — SIGNIFICANT CHANGE UP (ref 70–99)
GLUCOSE SERPL-MCNC: 325 MG/DL — HIGH (ref 70–99)
HAPTOGLOB SERPL-MCNC: 225 MG/DL — HIGH (ref 34–200)
HCT VFR BLD CALC: 30.1 % — LOW (ref 37–47)
HDLC SERPL-MCNC: 26 MG/DL — LOW
HGB BLD-MCNC: 8.4 G/DL — LOW (ref 12–16)
IMM GRANULOCYTES NFR BLD AUTO: 0.8 % — HIGH (ref 0.1–0.3)
IRON SATN MFR SERPL: 27 UG/DL — LOW (ref 35–150)
IRON SATN MFR SERPL: 9 % — LOW (ref 15–50)
LA NT DPL PPP QL: SIGNIFICANT CHANGE UP
LDH SERPL L TO P-CCNC: 247 — HIGH (ref 50–242)
LIPID PNL WITH DIRECT LDL SERPL: 102 MG/DL — SIGNIFICANT CHANGE UP (ref 4–129)
LYMPHOCYTES # BLD AUTO: 1.14 K/UL — LOW (ref 1.2–3.4)
LYMPHOCYTES # BLD AUTO: 23 % — SIGNIFICANT CHANGE UP (ref 20.5–51.1)
MAGNESIUM SERPL-MCNC: 1.8 MG/DL — SIGNIFICANT CHANGE UP (ref 1.8–2.4)
MCHC RBC-ENTMCNC: 20 PG — LOW (ref 27–31)
MCHC RBC-ENTMCNC: 27.9 G/DL — LOW (ref 32–37)
MCV RBC AUTO: 71.8 FL — LOW (ref 81–99)
MONOCYTES # BLD AUTO: 0.41 K/UL — SIGNIFICANT CHANGE UP (ref 0.1–0.6)
MONOCYTES NFR BLD AUTO: 8.3 % — SIGNIFICANT CHANGE UP (ref 1.7–9.3)
NEUTROPHILS # BLD AUTO: 3.17 K/UL — SIGNIFICANT CHANGE UP (ref 1.4–6.5)
NEUTROPHILS NFR BLD AUTO: 63.9 % — SIGNIFICANT CHANGE UP (ref 42.2–75.2)
NORMALIZED SCT PPP-RTO: 0.92 RATIO — SIGNIFICANT CHANGE UP (ref 0–1.16)
NORMALIZED SCT PPP-RTO: SIGNIFICANT CHANGE UP
NRBC # BLD: 0 /100 WBCS — SIGNIFICANT CHANGE UP (ref 0–0)
PLATELET # BLD AUTO: 281 K/UL — SIGNIFICANT CHANGE UP (ref 130–400)
POTASSIUM SERPL-MCNC: 3.6 MMOL/L — SIGNIFICANT CHANGE UP (ref 3.5–5)
POTASSIUM SERPL-SCNC: 3.6 MMOL/L — SIGNIFICANT CHANGE UP (ref 3.5–5)
PROT C ACT/NOR PPP: 89 % — SIGNIFICANT CHANGE UP (ref 65–129)
RBC # BLD: 4.19 M/UL — LOW (ref 4.2–5.4)
RBC # BLD: 4.19 M/UL — LOW (ref 4.2–5.4)
RBC # FLD: 24.9 % — HIGH (ref 11.5–14.5)
RETICS #: 104.3 K/UL — SIGNIFICANT CHANGE UP (ref 25–125)
RETICS/RBC NFR: 2.5 % — HIGH (ref 0.5–1.5)
SARS-COV-2 RNA SPEC QL NAA+PROBE: SIGNIFICANT CHANGE UP
SODIUM SERPL-SCNC: 134 MMOL/L — LOW (ref 135–146)
TIBC SERPL-MCNC: 317 UG/DL — SIGNIFICANT CHANGE UP (ref 220–430)
TOTAL CHOLESTEROL/HDL RATIO MEASUREMENT: 5.8 RATIO — HIGH (ref 4–5.5)
TRIGL SERPL-MCNC: 153 MG/DL — HIGH (ref 10–149)
TROPONIN T SERPL-MCNC: <0.01 NG/ML — SIGNIFICANT CHANGE UP
UIBC SERPL-MCNC: 290 UG/DL — SIGNIFICANT CHANGE UP (ref 110–370)
WBC # BLD: 4.96 K/UL — SIGNIFICANT CHANGE UP (ref 4.8–10.8)
WBC # FLD AUTO: 4.96 K/UL — SIGNIFICANT CHANGE UP (ref 4.8–10.8)

## 2020-09-10 PROCEDURE — 71275 CT ANGIOGRAPHY CHEST: CPT | Mod: 26

## 2020-09-10 PROCEDURE — 99233 SBSQ HOSP IP/OBS HIGH 50: CPT

## 2020-09-10 PROCEDURE — 93970 EXTREMITY STUDY: CPT | Mod: 26

## 2020-09-10 RX ORDER — SODIUM CHLORIDE 9 MG/ML
1000 INJECTION, SOLUTION INTRAVENOUS
Refills: 0 | Status: DISCONTINUED | OUTPATIENT
Start: 2020-09-10 | End: 2020-09-13

## 2020-09-10 RX ORDER — FERROUS SULFATE 325(65) MG
325 TABLET ORAL DAILY
Refills: 0 | Status: DISCONTINUED | OUTPATIENT
Start: 2020-09-11 | End: 2020-09-13

## 2020-09-10 RX ORDER — MORPHINE SULFATE 50 MG/1
4 CAPSULE, EXTENDED RELEASE ORAL ONCE
Refills: 0 | Status: DISCONTINUED | OUTPATIENT
Start: 2020-09-10 | End: 2020-09-13

## 2020-09-10 RX ORDER — DEXTROSE 50 % IN WATER 50 %
25 SYRINGE (ML) INTRAVENOUS ONCE
Refills: 0 | Status: DISCONTINUED | OUTPATIENT
Start: 2020-09-10 | End: 2020-09-13

## 2020-09-10 RX ORDER — ACETAMINOPHEN 500 MG
650 TABLET ORAL ONCE
Refills: 0 | Status: COMPLETED | OUTPATIENT
Start: 2020-09-10 | End: 2020-09-10

## 2020-09-10 RX ORDER — KETOROLAC TROMETHAMINE 30 MG/ML
30 SYRINGE (ML) INJECTION ONCE
Refills: 0 | Status: DISCONTINUED | OUTPATIENT
Start: 2020-09-10 | End: 2020-09-13

## 2020-09-10 RX ORDER — INSULIN LISPRO 100/ML
5 VIAL (ML) SUBCUTANEOUS
Refills: 0 | Status: DISCONTINUED | OUTPATIENT
Start: 2020-09-10 | End: 2020-09-10

## 2020-09-10 RX ORDER — GLUCAGON INJECTION, SOLUTION 0.5 MG/.1ML
1 INJECTION, SOLUTION SUBCUTANEOUS ONCE
Refills: 0 | Status: DISCONTINUED | OUTPATIENT
Start: 2020-09-10 | End: 2020-09-13

## 2020-09-10 RX ORDER — IRON SUCROSE 20 MG/ML
200 INJECTION, SOLUTION INTRAVENOUS ONCE
Refills: 0 | Status: COMPLETED | OUTPATIENT
Start: 2020-09-10 | End: 2020-09-10

## 2020-09-10 RX ORDER — INSULIN HUMAN 100 [IU]/ML
8 INJECTION, SOLUTION SUBCUTANEOUS ONCE
Refills: 0 | Status: COMPLETED | OUTPATIENT
Start: 2020-09-10 | End: 2020-09-10

## 2020-09-10 RX ORDER — INSULIN LISPRO 100/ML
15 VIAL (ML) SUBCUTANEOUS
Refills: 0 | Status: DISCONTINUED | OUTPATIENT
Start: 2020-09-10 | End: 2020-09-10

## 2020-09-10 RX ORDER — DEXTROSE 50 % IN WATER 50 %
15 SYRINGE (ML) INTRAVENOUS ONCE
Refills: 0 | Status: DISCONTINUED | OUTPATIENT
Start: 2020-09-10 | End: 2020-09-13

## 2020-09-10 RX ORDER — INSULIN LISPRO 100/ML
VIAL (ML) SUBCUTANEOUS
Refills: 0 | Status: DISCONTINUED | OUTPATIENT
Start: 2020-09-10 | End: 2020-09-13

## 2020-09-10 RX ORDER — DEXTROSE 50 % IN WATER 50 %
12.5 SYRINGE (ML) INTRAVENOUS ONCE
Refills: 0 | Status: DISCONTINUED | OUTPATIENT
Start: 2020-09-10 | End: 2020-09-13

## 2020-09-10 RX ORDER — INSULIN LISPRO 100/ML
5 VIAL (ML) SUBCUTANEOUS
Refills: 0 | Status: DISCONTINUED | OUTPATIENT
Start: 2020-09-10 | End: 2020-09-13

## 2020-09-10 RX ORDER — INFLUENZA VIRUS VACCINE 15; 15; 15; 15 UG/.5ML; UG/.5ML; UG/.5ML; UG/.5ML
0.5 SUSPENSION INTRAMUSCULAR ONCE
Refills: 0 | Status: DISCONTINUED | OUTPATIENT
Start: 2020-09-10 | End: 2020-09-13

## 2020-09-10 RX ORDER — INSULIN GLARGINE 100 [IU]/ML
15 INJECTION, SOLUTION SUBCUTANEOUS AT BEDTIME
Refills: 0 | Status: DISCONTINUED | OUTPATIENT
Start: 2020-09-10 | End: 2020-09-13

## 2020-09-10 RX ADMIN — Medication 5 UNIT(S): at 08:48

## 2020-09-10 RX ADMIN — Medication 100 MILLIGRAM(S): at 22:03

## 2020-09-10 RX ADMIN — IRON SUCROSE 110 MILLIGRAM(S): 20 INJECTION, SOLUTION INTRAVENOUS at 17:36

## 2020-09-10 RX ADMIN — Medication 650 MILLIGRAM(S): at 20:33

## 2020-09-10 RX ADMIN — Medication 200 MILLIGRAM(S): at 19:19

## 2020-09-10 RX ADMIN — ENOXAPARIN SODIUM 60 MILLIGRAM(S): 100 INJECTION SUBCUTANEOUS at 05:20

## 2020-09-10 RX ADMIN — Medication 100 MILLIGRAM(S): at 14:59

## 2020-09-10 RX ADMIN — INSULIN GLARGINE 15 UNIT(S): 100 INJECTION, SOLUTION SUBCUTANEOUS at 22:03

## 2020-09-10 RX ADMIN — ENOXAPARIN SODIUM 60 MILLIGRAM(S): 100 INJECTION SUBCUTANEOUS at 18:38

## 2020-09-10 RX ADMIN — Medication 5 UNIT(S): at 12:05

## 2020-09-10 RX ADMIN — Medication 100 MILLIGRAM(S): at 00:54

## 2020-09-10 RX ADMIN — INSULIN HUMAN 8 UNIT(S): 100 INJECTION, SOLUTION SUBCUTANEOUS at 14:59

## 2020-09-10 RX ADMIN — Medication 650 MILLIGRAM(S): at 20:03

## 2020-09-10 RX ADMIN — Medication 4: at 12:05

## 2020-09-10 RX ADMIN — Medication 200 MILLIGRAM(S): at 05:21

## 2020-09-10 RX ADMIN — Medication 100 MILLIGRAM(S): at 05:20

## 2020-09-10 NOTE — PROGRESS NOTE ADULT - ASSESSMENT
Chinese speaking, 50F w/ PMHx of Pyelonephritis presents with c/o acute onset of Lt flank pain with radiation to Lt groin.      IMPRESSION  Acute  PE  subsegmental PE  Low Risk   > Unsure  if provoked or unprovoked   > Unsure  if related  Uterus Mass   Doubt Cystitis   Doubt  DKA  Hepatic steatosis w/o ETOH use  Heterogeneous uterus with a 5.6 cm subserosal fibroid along the left uterine body pressing on the body.  Acute  likely  Colitis Questionable rectal wall thickening with mild perirectal inflammatory changes and ill-defined presacral soft tissue thickening/edema.  Likely Undiagnosed DM   HO Pyonephritis now resolved       Plan  Ob/Gyn consulted  for  possible intervention planing   F/u IRON studies  F/u a1C  and FS    Continue therapeutic Lovenox  f/u [facility] duplex official read  Monitor H/H  C/w Abx  for now Cipro/flagyl short  course   f/u beta hydroxybutyrate         Electrolyte Imbalances: Correct as needed  []  Hyponatremia   /   Hypernatremia  []   []  Hypokalemia   /   Hyperkalemia  []   []  Hypochlorhydria   /    Hypochlorhydria  []   []  Hypomagnesemia   /   Hypermagnesemia  []   []  Hypophosphatemia   /   Hyperphosphatemia  []       GI ppx:                                   [] Not indicated   /   [] Pantoprazole 40mg PO Daily    DVT ppx: Therapeutic Lovenox   [] Not indicated / [] Heparin 5000mg SubQ / [] Lovenox 40mg SubQ / [] SCDs    Fluids:   [x] PO  |  [] IVF    Activity:  [X] Assisatnce needed   [X] Increase as Tolerated  /  [] OOB w/ assist  /  [] Bed Rest    BMI:      DISPO:  Patient to be discharged when condition(s) optimized.  [x ] From Home     [ ] NH/SNF   [ ] 4A Rehab  [ ] Detox Clinic  [X] Plan Discussion with patient and/or family.  [X] Discussed Case and Plan with the Medical Attending.    CODE STATUS  [X] FULL   /    [] DNR Malagasy speaking, 50F w/ PMHx of Pyelonephritis presents with c/o acute onset of Lt flank pain with radiation to Lt groin.      IMPRESSION  Acute  PE  subsegmental PE  Low Risk   > Unsure  if provoked or unprovoked   > Unsure  if related  Uterus Mass   Doubt Cystitis   Doubt  DKA  Hepatic steatosis w/o ETOH use  Heterogeneous uterus with a 5.6 cm subserosal fibroid along the left uterine body pressing on the body.  Acute  likely  Colitis Questionable rectal wall thickening with mild perirectal inflammatory changes and ill-defined presacral soft tissue thickening/edema.  Likely Undiagnosed DM   HO Pyonephritis now resolved   LUIS FELIPE      Plan  Ob/Gyn consulted  for  possible intervention planing   Venofer 200 mg x 1 then start po iron   F/u a1C  and FS    Continue therapeutic Lovenox  f/u [facility] duplex official read  Monitor H/H  C/w Abx  for now Cipro/flagyl short  course   f/u beta hydroxybutyrate         Electrolyte Imbalances: Correct as needed  []  Hyponatremia   /   Hypernatremia  []   []  Hypokalemia   /   Hyperkalemia  []   []  Hypochlorhydria   /    Hypochlorhydria  []   []  Hypomagnesemia   /   Hypermagnesemia  []   []  Hypophosphatemia   /   Hyperphosphatemia  []       GI ppx:                                   [] Not indicated   /   [] Pantoprazole 40mg PO Daily    DVT ppx: Therapeutic Lovenox   [] Not indicated / [] Heparin 5000mg SubQ / [] Lovenox 40mg SubQ / [] SCDs    Fluids:   [x] PO  |  [] IVF    Activity:  [X] Assisatnce needed   [X] Increase as Tolerated  /  [] OOB w/ assist  /  [] Bed Rest    BMI:      DISPO:  Patient to be discharged when condition(s) optimized.  [x ] From Home     [ ] NH/SNF   [ ] 4A Rehab  [ ] Detox Clinic  [X] Plan Discussion with patient and/or family.  [X] Discussed Case and Plan with the Medical Attending.    CODE STATUS  [X] FULL   /    [] DNR

## 2020-09-10 NOTE — PROGRESS NOTE ADULT - SUBJECTIVE AND OBJECTIVE BOX
LENGTH OF HOSPITAL STAY: 1d      CHIEF COMPLAINT:   Patient is a 50y old  Female who presents with a chief complaint of Left flank pain (09 Sep 2020 22:51)    : [id number]    OVER Past 24hrs:  The patient was seen and examined at bedside there were no acute events  during the night.  Patient  states that   her  abdominal pain has resolved. She  states that she is having bowel movements.     HISTORY OF PRESENTING ILLNESS:    HPI:  Tongan speaking, 50F w/ PMHx of Pyelonephritis presents with c/o acute onset of Lt flank pain with radiation to Lt groin. Left flank pain started last night, sharp in quality, 10/10 on pain scale, radiating to Lt groin, improved with tylenol, and associated with Nausea and chills. Currently she Denies HA, fatigue, fever, chills, dizziness, change in vision, runny nose, sore throat, CP, SOB, cough, wheezing, abd pain, nausea, vomiting, diarrhea, constipation, blood in stool or urine, and dysuria. Denies recent travel, sick contacts, no pets. Off note, she had Lt pyelonephritis on previous admission . CTAP at that time showed large fibroid mass with mass effect on bladder. No evidence of hydro or nephrolithiasis.     ED vitals:   /75, HR 91, RR 18, T 99f  CTAP: acute Rt lower lobe subsegmental PE, Rectal wall thickening likely proctitis and hepatic steatosis  Received Rocephin 1g IVx1, Cipro 400mg IVx1, 1L IVF bolus, and morphine (09 Sep 2020 22:51)    PAST MEDICAL & SURGICAL HISTORY  PAST MEDICAL & SURGICAL HISTORY:  Pyelonephritis  No significant past surgical history        REVIEW OF SYSTEMS  CONSTITUTIONAL: No fever, weight loss, or fatigue  EYES: No eye pain, visual disturbances, or discharge  RESPIRATORY: No cough, wheezing, chills or hemoptysis; No shortness of breath  CARDIOVASCULAR: No chest pain, palpitations, dizziness, or leg swelling  GASTROINTESTINAL: No abdominal or epigastric pain. No nausea, vomiting, or hematemesis; No diarrhea or constipation. No melena or hematochezia.  GENITOURINARY: No dysuria, frequency, hematuria, or incontinence  NEUROLOGICAL: No headaches, memory loss, loss of strength, numbness, or tremors  ENDOCRINE: No heat or cold intolerance; No hair loss  MUSCULOSKELETAL: No joint pain or swelling; No muscle, back, or extremity pain  PSYCHIATRIC: No depression, anxiety, mood swings, or difficulty sleeping  HEME/LYMPH: No easy bruising, or bleeding gums  ALLERY AND IMMUNOLOGIC: No hives or eczema    ALLERGIES:  No Known Allergies    MEDICATIONS:  STANDING MEDICATIONS  chlorhexidine 4% Liquid 1 Application(s) Topical <User Schedule>  ciprofloxacin   IVPB 400 milliGRAM(s) IV Intermittent every 12 hours  dextrose 5%. 1000 milliLiter(s) IV Continuous <Continuous>  dextrose 50% Injectable 12.5 Gram(s) IV Push once  dextrose 50% Injectable 25 Gram(s) IV Push once  dextrose 50% Injectable 25 Gram(s) IV Push once  enoxaparin Injectable 60 milliGRAM(s) SubCutaneous two times a day  insulin glargine Injectable (LANTUS) 15 Unit(s) SubCutaneous at bedtime  insulin lispro (HumaLOG) corrective regimen sliding scale   SubCutaneous three times a day before meals  insulin lispro Injectable (HumaLOG) 5 Unit(s) SubCutaneous three times a day before meals  ketorolac   Injectable 30 milliGRAM(s) IV Push once  metroNIDAZOLE  IVPB      metroNIDAZOLE  IVPB 500 milliGRAM(s) IV Intermittent every 8 hours  morphine  - Injectable 4 milliGRAM(s) IV Push once    PRN MEDICATIONS  dextrose 40% Gel 15 Gram(s) Oral once PRN  glucagon  Injectable 1 milliGRAM(s) IntraMuscular once PRN    VITALS:   T(F): 98.5  HR: 80  BP: 96/56  RR: 18  SpO2: 98%    PHYSICAL EXAM:  General: No acute distress.  Alert, oriented, interactive, nonfocal.    HEENT: Pupils equal, reactive to light symmetrically.    PULM: Clear to auscultation bilaterally, no significant sputum production.    CVS: Regular rate and rhythm, no murmurs, rubs, or gallops.    GI: Soft, nondistended, nontender, no masses.    MSK: No edema, nontender.    SKIN: Warm and well perfused, no rashes noted.    PSYCH: AAOx3    NEURO: non focal     LABS:                        8.4    4.96  )-----------( 281      ( 10 Sep 2020 06:09 )             30.1     09-10    134<L>  |  99  |  5<L>  ----------------------------<  325<H>  3.6   |  24  |  <0.5<L>    Ca    8.5      10 Sep 2020 06:09  Mg     1.8     09-10    TPro  7.7  /  Alb  4.2  /  TBili  0.5  /  DBili  x   /  AST  59<H>  /  ALT  27  /  AlkPhos  86      PT/INR - ( 09 Sep 2020 18:05 )   PT: 13.20 sec;   INR: 1.15 ratio         PTT - ( 09 Sep 2020 18:05 )  PTT:25.3 sec  Urinalysis Basic - ( 09 Sep 2020 19:25 )    Color: Light Yellow / Appearance: Clear / S.035 / pH: x  Gluc: x / Ketone: Trace  / Bili: Negative / Urobili: <2 mg/dL   Blood: x / Protein: Negative / Nitrite: Positive   Leuk Esterase: Negative / RBC: 1 /HPF / WBC 3 /HPF   Sq Epi: x / Non Sq Epi: 1 /HPF / Bacteria: Many        Troponin T, Serum: <0.01 ng/mL (09-10-20 @ 00:50)  Troponin T, Serum: <0.01 ng/mL (20 @ 22:10)  Lactate, Blood: 1.5 mmol/L (20 @ 19:55)  Lactate, Blood: 2.9 mmol/L <H> (20 @ 16:23)      CARDIAC MARKERS ( 10 Sep 2020 00:50 )  x     / <0.01 ng/mL / x     / x     / <1.0 ng/mL  CARDIAC MARKERS ( 09 Sep 2020 22:10 )  x     / <0.01 ng/mL / x     / x     / x          RADIOLOGY:    < from: CT Angio Chest w/ IV Cont (09.10.20 @ 05:01) >  FINDINGS:    PULMONARY EMBOLUS: Redemonstration of the right lower lobe subsegmental pulmonary emboli. Questionable left lower lobe subsegmental pulmonary emboli -limited evaluation due to motion. No CT evidence of right heart strain..    LUNGS/PLEURA/AIRWAYS: Central airways are patent. Right upper lobe calcified granuloma. No focal parenchymal consolidation, pleural effusion, or pneumothorax. Dependent atelectasis.    MEDIASTINUM/THORACIC NODES: No mediastinal hilar or axillary lymphadenopathy.. Visualized thyroid gland is unremarkable.    HEART/GREAT VESSELS: Normal heart size. No pericardial effusion.    UPPER ABDOMEN: Unremarkable    BONES AND SOFT TISSUES: Unremarkable.      IMPRESSION:    Right lower lobe subsegmental pulmonary emboli. Questionable left lower lobe subsegmental pulmonary emboli. No CT evidence of right heart strain.    < end of copied text >      < from: CT Abdomen and Pelvis w/ IV Cont (20 @ 19:51) >  FINDINGS:    LOWER CHEST: Acute right lower lobe pulmonary emboli in several subsegmental branches (series 5 image 40, 46 and 56).    HEPATOBILIARY: Marked diffuse hepatic steatosis.    SPLEEN: Unremarkable.    PANCREAS: Unremarkable.    ADRENAL GLANDS: Unremarkable.    KIDNEYS: Symmetric renal enhancement. No hydronephrosis.    ABDOMINOPELVIC NODES: No lymphadenopathy    PELVIC ORGANS: Heterogeneous uterus with a 5.6 cm subserosal fibroid along the left uterine body . Uterus exerts mass effect and displaces urinary bladder intothe right hemipelvis. Prominent left gonadal vein measuring up to 8 mm.    PERITONEUM/MESENTERY/BOWEL: No evidence of bowel obstruction, pneumoperitoneum, or ascites. Unremarkable appendix. Questionable rectal wall thickening and mild perirectal fat stranding and few subcentimeter lymph nodes (series 5 image 367). Ill-defined presacral soft tissue thickening/edema, new since May 10, 2020 ( series 7 images 36).    BONES/SOFT TISSUES: Unremarkable.    IMPRESSION:  1.  * Acute right lower lobe subsegmental pulmonary emboli.    2.  Questionable rectal wall thickening with mild perirectal inflammatory changes and ill-defined presacral soft tissue thickening/edema. Findings may represent proctitis in the appropriate clinical setting.    3.  Hepaticsteatosis    < end of copied text >      Diagnosis Line Normal sinus rhythm  Normal ECG    < end of copied text > LENGTH OF HOSPITAL STAY: 1d      CHIEF COMPLAINT:   Patient is a 50y old  Female who presents with a chief complaint of Left flank pain (09 Sep 2020 22:51)    : [id number]    OVER Past 24hrs:  The patient was seen and examined at bedside there were no acute events  during the night.  Patient  states that   her  abdominal pain has resolved. She  states that she is having bowel movements.     Attending Note: Pt seen and examined at bedside.  line used [id number]. No cp or sob. Pt updated on findings of PE and fibroid mass and plan.     HISTORY OF PRESENTING ILLNESS:    HPI:  Arabic speaking, 50F w/ PMHx of Pyelonephritis presents with c/o acute onset of Lt flank pain with radiation to Lt groin. Left flank pain started last night, sharp in quality, 10/10 on pain scale, radiating to Lt groin, improved with tylenol, and associated with Nausea and chills. Currently she Denies HA, fatigue, fever, chills, dizziness, change in vision, runny nose, sore throat, CP, SOB, cough, wheezing, abd pain, nausea, vomiting, diarrhea, constipation, blood in stool or urine, and dysuria. Denies recent travel, sick contacts, no pets. Off note, she had Lt pyelonephritis on previous admission . CTAP at that time showed large fibroid mass with mass effect on bladder. No evidence of hydro or nephrolithiasis.     ED vitals:   /75, HR 91, RR 18, T 99f  CTAP: acute Rt lower lobe subsegmental PE, Rectal wall thickening likely proctitis and hepatic steatosis  Received Rocephin 1g IVx1, Cipro 400mg IVx1, 1L IVF bolus, and morphine (09 Sep 2020 22:51)    PAST MEDICAL & SURGICAL HISTORY  PAST MEDICAL & SURGICAL HISTORY:  Pyelonephritis  No significant past surgical history        REVIEW OF SYSTEMS  CONSTITUTIONAL: No fever, weight loss, or fatigue  EYES: No eye pain, visual disturbances, or discharge  RESPIRATORY: No cough, wheezing, chills or hemoptysis; No shortness of breath  CARDIOVASCULAR: No chest pain, palpitations, dizziness, or leg swelling  GASTROINTESTINAL: No abdominal or epigastric pain. No nausea, vomiting, or hematemesis; No diarrhea or constipation. No melena or hematochezia.  GENITOURINARY: No dysuria, frequency, hematuria, or incontinence  NEUROLOGICAL: No headaches, memory loss, loss of strength, numbness, or tremors  ENDOCRINE: No heat or cold intolerance; No hair loss  MUSCULOSKELETAL: No joint pain or swelling; No muscle, back, or extremity pain  PSYCHIATRIC: No depression, anxiety, mood swings, or difficulty sleeping  HEME/LYMPH: No easy bruising, or bleeding gums  ALLERY AND IMMUNOLOGIC: No hives or eczema    ALLERGIES:  No Known Allergies    MEDICATIONS:  STANDING MEDICATIONS  chlorhexidine 4% Liquid 1 Application(s) Topical <User Schedule>  ciprofloxacin   IVPB 400 milliGRAM(s) IV Intermittent every 12 hours  dextrose 5%. 1000 milliLiter(s) IV Continuous <Continuous>  dextrose 50% Injectable 12.5 Gram(s) IV Push once  dextrose 50% Injectable 25 Gram(s) IV Push once  dextrose 50% Injectable 25 Gram(s) IV Push once  enoxaparin Injectable 60 milliGRAM(s) SubCutaneous two times a day  insulin glargine Injectable (LANTUS) 15 Unit(s) SubCutaneous at bedtime  insulin lispro (HumaLOG) corrective regimen sliding scale   SubCutaneous three times a day before meals  insulin lispro Injectable (HumaLOG) 5 Unit(s) SubCutaneous three times a day before meals  ketorolac   Injectable 30 milliGRAM(s) IV Push once  metroNIDAZOLE  IVPB      metroNIDAZOLE  IVPB 500 milliGRAM(s) IV Intermittent every 8 hours  morphine  - Injectable 4 milliGRAM(s) IV Push once    PRN MEDICATIONS  dextrose 40% Gel 15 Gram(s) Oral once PRN  glucagon  Injectable 1 milliGRAM(s) IntraMuscular once PRN    VITALS:   T(F): 98.5  HR: 80  BP: 96/56  RR: 18  SpO2: 98%    PHYSICAL EXAM:  General: No acute distress.  Alert, oriented, interactive, nonfocal.    HEENT: Pupils equal, reactive to light symmetrically.    PULM: Clear to auscultation bilaterally, no significant sputum production.    CVS: Regular rate and rhythm, no murmurs, rubs, or gallops.    GI: Soft, nondistended, nontender, no masses.    MSK: No edema, nontender.    SKIN: Warm and well perfused, no rashes noted.    PSYCH: AAOx3    NEURO: non focal     LABS:                        8.4    4.96  )-----------( 281      ( 10 Sep 2020 06:09 )             30.1     09-10    134<L>  |  99  |  5<L>  ----------------------------<  325<H>  3.6   |  24  |  <0.5<L>    Ca    8.5      10 Sep 2020 06:09  Mg     1.8     09-10    TPro  7.7  /  Alb  4.2  /  TBili  0.5  /  DBili  x   /  AST  59<H>  /  ALT  27  /  AlkPhos  86      PT/INR - ( 09 Sep 2020 18:05 )   PT: 13.20 sec;   INR: 1.15 ratio         PTT - ( 09 Sep 2020 18:05 )  PTT:25.3 sec  Urinalysis Basic - ( 09 Sep 2020 19:25 )    Color: Light Yellow / Appearance: Clear / S.035 / pH: x  Gluc: x / Ketone: Trace  / Bili: Negative / Urobili: <2 mg/dL   Blood: x / Protein: Negative / Nitrite: Positive   Leuk Esterase: Negative / RBC: 1 /HPF / WBC 3 /HPF   Sq Epi: x / Non Sq Epi: 1 /HPF / Bacteria: Many        Troponin T, Serum: <0.01 ng/mL (09-10-20 @ 00:50)  Troponin T, Serum: <0.01 ng/mL (20 @ 22:10)  Lactate, Blood: 1.5 mmol/L (20 @ 19:55)  Lactate, Blood: 2.9 mmol/L <H> (20 @ 16:23)      CARDIAC MARKERS ( 10 Sep 2020 00:50 )  x     / <0.01 ng/mL / x     / x     / <1.0 ng/mL  CARDIAC MARKERS ( 09 Sep 2020 22:10 )  x     / <0.01 ng/mL / x     / x     / x          RADIOLOGY:    < from: CT Angio Chest w/ IV Cont (09.10.20 @ 05:01) >  FINDINGS:    PULMONARY EMBOLUS: Redemonstration of the right lower lobe subsegmental pulmonary emboli. Questionable left lower lobe subsegmental pulmonary emboli -limited evaluation due to motion. No CT evidence of right heart strain..    LUNGS/PLEURA/AIRWAYS: Central airways are patent. Right upper lobe calcified granuloma. No focal parenchymal consolidation, pleural effusion, or pneumothorax. Dependent atelectasis.    MEDIASTINUM/THORACIC NODES: No mediastinal hilar or axillary lymphadenopathy.. Visualized thyroid gland is unremarkable.    HEART/GREAT VESSELS: Normal heart size. No pericardial effusion.    UPPER ABDOMEN: Unremarkable    BONES AND SOFT TISSUES: Unremarkable.      IMPRESSION:    Right lower lobe subsegmental pulmonary emboli. Questionable left lower lobe subsegmental pulmonary emboli. No CT evidence of right heart strain.    < end of copied text >      < from: CT Abdomen and Pelvis w/ IV Cont (20 @ 19:51) >  FINDINGS:    LOWER CHEST: Acute right lower lobe pulmonary emboli in several subsegmental branches (series 5 image 40, 46 and 56).    HEPATOBILIARY: Marked diffuse hepatic steatosis.    SPLEEN: Unremarkable.    PANCREAS: Unremarkable.    ADRENAL GLANDS: Unremarkable.    KIDNEYS: Symmetric renal enhancement. No hydronephrosis.    ABDOMINOPELVIC NODES: No lymphadenopathy    PELVIC ORGANS: Heterogeneous uterus with a 5.6 cm subserosal fibroid along the left uterine body . Uterus exerts mass effect and displaces urinary bladder intothe right hemipelvis. Prominent left gonadal vein measuring up to 8 mm.    PERITONEUM/MESENTERY/BOWEL: No evidence of bowel obstruction, pneumoperitoneum, or ascites. Unremarkable appendix. Questionable rectal wall thickening and mild perirectal fat stranding and few subcentimeter lymph nodes (series 5 image 367). Ill-defined presacral soft tissue thickening/edema, new since May 10, 2020 ( series 7 images 36).    BONES/SOFT TISSUES: Unremarkable.    IMPRESSION:  1.  * Acute right lower lobe subsegmental pulmonary emboli.    2.  Questionable rectal wall thickening with mild perirectal inflammatory changes and ill-defined presacral soft tissue thickening/edema. Findings may represent proctitis in the appropriate clinical setting.    3.  Hepaticsteatosis    < end of copied text >      Diagnosis Line Normal sinus rhythm  Normal ECG    < end of copied text >

## 2020-09-10 NOTE — PROGRESS NOTE ADULT - ATTENDING COMMENTS
#Progress Note Handoff  Pending (specify):  follow up Gyn  Family discussion: spoke to family and pt with  and updated on plan  Disposition: Home___/SNF___/Other___/Unknown at this time_x__  Pt seen during COVID 19 Pandemic.

## 2020-09-11 LAB
A1C WITH ESTIMATED AVERAGE GLUCOSE RESULT: 10.3 % — HIGH (ref 4–5.6)
ALBUMIN SERPL ELPH-MCNC: 3.6 G/DL — SIGNIFICANT CHANGE UP (ref 3.5–5.2)
ALP SERPL-CCNC: 62 U/L — SIGNIFICANT CHANGE UP (ref 30–115)
ALT FLD-CCNC: 28 U/L — SIGNIFICANT CHANGE UP (ref 0–41)
ANION GAP SERPL CALC-SCNC: 7 MMOL/L — SIGNIFICANT CHANGE UP (ref 7–14)
APCR PPP: 2.6 RATIO — SIGNIFICANT CHANGE UP
APTT BLD: 32.9 SEC — SIGNIFICANT CHANGE UP (ref 27–39.2)
AST SERPL-CCNC: 40 U/L — SIGNIFICANT CHANGE UP (ref 0–41)
AT III ACT/NOR PPP CHRO: 92 % — SIGNIFICANT CHANGE UP (ref 85–135)
B-OH-BUTYR SERPL-SCNC: <0.2 MMOL/L — SIGNIFICANT CHANGE UP
BASOPHILS # BLD AUTO: 0.01 K/UL — SIGNIFICANT CHANGE UP (ref 0–0.2)
BASOPHILS NFR BLD AUTO: 0.2 % — SIGNIFICANT CHANGE UP (ref 0–1)
BILIRUB SERPL-MCNC: 0.5 MG/DL — SIGNIFICANT CHANGE UP (ref 0.2–1.2)
BUN SERPL-MCNC: 5 MG/DL — LOW (ref 10–20)
CALCIUM SERPL-MCNC: 8.8 MG/DL — SIGNIFICANT CHANGE UP (ref 8.5–10.1)
CARDIOLIPIN AB SER-ACNC: NEGATIVE — SIGNIFICANT CHANGE UP
CHLORIDE SERPL-SCNC: 102 MMOL/L — SIGNIFICANT CHANGE UP (ref 98–110)
CO2 SERPL-SCNC: 24 MMOL/L — SIGNIFICANT CHANGE UP (ref 17–32)
CREAT SERPL-MCNC: <0.5 MG/DL — LOW (ref 0.7–1.5)
EOSINOPHIL # BLD AUTO: 0.11 K/UL — SIGNIFICANT CHANGE UP (ref 0–0.7)
EOSINOPHIL NFR BLD AUTO: 2.5 % — SIGNIFICANT CHANGE UP (ref 0–8)
ESTIMATED AVERAGE GLUCOSE: 249 MG/DL — HIGH (ref 68–114)
FERRITIN SERPL-MCNC: 120 NG/ML — SIGNIFICANT CHANGE UP (ref 15–150)
GLUCOSE BLDC GLUCOMTR-MCNC: 219 MG/DL — HIGH (ref 70–99)
GLUCOSE BLDC GLUCOMTR-MCNC: 286 MG/DL — HIGH (ref 70–99)
GLUCOSE BLDC GLUCOMTR-MCNC: 292 MG/DL — HIGH (ref 70–99)
GLUCOSE BLDC GLUCOMTR-MCNC: 297 MG/DL — HIGH (ref 70–99)
GLUCOSE SERPL-MCNC: 272 MG/DL — HIGH (ref 70–99)
HAV IGM SER-ACNC: SIGNIFICANT CHANGE UP
HBV CORE IGM SER-ACNC: SIGNIFICANT CHANGE UP
HBV SURFACE AG SER-ACNC: SIGNIFICANT CHANGE UP
HCT VFR BLD CALC: 30.4 % — LOW (ref 37–47)
HCV AB S/CO SERPL IA: 0.47 S/CO — SIGNIFICANT CHANGE UP (ref 0–0.99)
HCV AB SERPL-IMP: SIGNIFICANT CHANGE UP
HGB BLD-MCNC: 8.3 G/DL — LOW (ref 12–16)
IMM GRANULOCYTES NFR BLD AUTO: 0.9 % — HIGH (ref 0.1–0.3)
INR BLD: 1.22 RATIO — SIGNIFICANT CHANGE UP (ref 0.65–1.3)
IRON SATN MFR SERPL: 299 UG/DL — HIGH (ref 35–150)
IRON SATN MFR SERPL: 93 % — HIGH (ref 15–50)
LYMPHOCYTES # BLD AUTO: 0.94 K/UL — LOW (ref 1.2–3.4)
LYMPHOCYTES # BLD AUTO: 21.3 % — SIGNIFICANT CHANGE UP (ref 20.5–51.1)
MAGNESIUM SERPL-MCNC: 1.9 MG/DL — SIGNIFICANT CHANGE UP (ref 1.8–2.4)
MCHC RBC-ENTMCNC: 19.3 PG — LOW (ref 27–31)
MCHC RBC-ENTMCNC: 27.3 G/DL — LOW (ref 32–37)
MCV RBC AUTO: 70.7 FL — LOW (ref 81–99)
MONOCYTES # BLD AUTO: 0.44 K/UL — SIGNIFICANT CHANGE UP (ref 0.1–0.6)
MONOCYTES NFR BLD AUTO: 10 % — HIGH (ref 1.7–9.3)
NEUTROPHILS # BLD AUTO: 2.87 K/UL — SIGNIFICANT CHANGE UP (ref 1.4–6.5)
NEUTROPHILS NFR BLD AUTO: 65.1 % — SIGNIFICANT CHANGE UP (ref 42.2–75.2)
NRBC # BLD: 0 /100 WBCS — SIGNIFICANT CHANGE UP (ref 0–0)
PLATELET # BLD AUTO: 292 K/UL — SIGNIFICANT CHANGE UP (ref 130–400)
POTASSIUM SERPL-MCNC: 3.5 MMOL/L — SIGNIFICANT CHANGE UP (ref 3.5–5)
POTASSIUM SERPL-SCNC: 3.5 MMOL/L — SIGNIFICANT CHANGE UP (ref 3.5–5)
PROT SERPL-MCNC: 6.3 G/DL — SIGNIFICANT CHANGE UP (ref 6–8)
PROTHROM AB SERPL-ACNC: 14 SEC — HIGH (ref 9.95–12.87)
RBC # BLD: 4.3 M/UL — SIGNIFICANT CHANGE UP (ref 4.2–5.4)
RBC # FLD: 25 % — HIGH (ref 11.5–14.5)
SODIUM SERPL-SCNC: 133 MMOL/L — LOW (ref 135–146)
TIBC SERPL-MCNC: 322 UG/DL — SIGNIFICANT CHANGE UP (ref 220–430)
UIBC SERPL-MCNC: 23 UG/DL — LOW (ref 110–370)
WBC # BLD: 4.41 K/UL — LOW (ref 4.8–10.8)
WBC # FLD AUTO: 4.41 K/UL — LOW (ref 4.8–10.8)

## 2020-09-11 PROCEDURE — 99233 SBSQ HOSP IP/OBS HIGH 50: CPT

## 2020-09-11 RX ORDER — ENOXAPARIN SODIUM 100 MG/ML
60 INJECTION SUBCUTANEOUS
Refills: 0 | Status: DISCONTINUED | OUTPATIENT
Start: 2020-09-11 | End: 2020-09-13

## 2020-09-11 RX ORDER — APIXABAN 2.5 MG/1
10 TABLET, FILM COATED ORAL EVERY 12 HOURS
Refills: 0 | Status: DISCONTINUED | OUTPATIENT
Start: 2020-09-11 | End: 2020-09-11

## 2020-09-11 RX ORDER — WARFARIN SODIUM 2.5 MG/1
5 TABLET ORAL ONCE
Refills: 0 | Status: COMPLETED | OUTPATIENT
Start: 2020-09-11 | End: 2020-09-11

## 2020-09-11 RX ADMIN — ENOXAPARIN SODIUM 60 MILLIGRAM(S): 100 INJECTION SUBCUTANEOUS at 16:40

## 2020-09-11 RX ADMIN — INSULIN GLARGINE 15 UNIT(S): 100 INJECTION, SOLUTION SUBCUTANEOUS at 21:27

## 2020-09-11 RX ADMIN — Medication 100 MILLIGRAM(S): at 05:47

## 2020-09-11 RX ADMIN — Medication 5 UNIT(S): at 11:14

## 2020-09-11 RX ADMIN — Medication 2: at 16:40

## 2020-09-11 RX ADMIN — Medication 325 MILLIGRAM(S): at 11:14

## 2020-09-11 RX ADMIN — Medication 3: at 07:59

## 2020-09-11 RX ADMIN — Medication 5 UNIT(S): at 16:40

## 2020-09-11 RX ADMIN — Medication 5 UNIT(S): at 08:00

## 2020-09-11 RX ADMIN — ENOXAPARIN SODIUM 60 MILLIGRAM(S): 100 INJECTION SUBCUTANEOUS at 05:48

## 2020-09-11 RX ADMIN — Medication 3: at 11:14

## 2020-09-11 RX ADMIN — Medication 200 MILLIGRAM(S): at 06:38

## 2020-09-11 RX ADMIN — WARFARIN SODIUM 5 MILLIGRAM(S): 2.5 TABLET ORAL at 21:27

## 2020-09-11 NOTE — CONSULT NOTE ADULT - ASSESSMENT
Incomplete 50y P5 with LMP 8/28 with pulmonary embolism on anticoagulation, cystitis on antibiotics and newly diagnosed DMII, with incidental findings of a fibroid uterus stable in size, without GYN symptoms, currently clinically and hemodynamically stable,      -Recommend TVUS, notify residents when resulted and read  -Likely f/u outpatient with GYN at [facility] [phone]  -Management per primary team      Discussed with Dr. Suresh

## 2020-09-11 NOTE — PROGRESS NOTE ADULT - ASSESSMENT
50F w/ PMHx of Pyelonephritis presents with c/o acute onset of Lt flank pain with radiation to Lt groin. Found to have acute pulmonary embolism    #Acute PE  CT Angio 09/10: RLL subsegmental PE  O2 sat 98 on RA  - Heparin GGT  - Bridge to coumadin (pt uninsured, cannot afford DOAC)    Abdominal Pain, likely due to proctitis, resolved  - Observe off antibiotics  - Monitor    New onset DM2  Hba1c: 10.2%  - Cont lantus/lispro 10-5-5-5  - On Discharge, metformin 1000 BID with outpatient endocrine follow-up    DVT PPX, heparin

## 2020-09-11 NOTE — CONSULT NOTE ADULT - SUBJECTIVE AND OBJECTIVE BOX
Chief Complaint: flank pain resolved    50y P5 with LMP  presented to the ED with flank pain, was found to have a pulmonary embolism admitted to medicine for treatment of cystitis and PE. GYN consulted for CT findings of fibroid uterus with mass effect on urinary bladder. Patient seen and evaluated at bedside. She feels well now. She reports she presented with left flank pain radiating to the groin, now resolved. She reports unintentional weight loss for the past few months. She denies any abdominal pain, vaginal bleeding, vaginal discharge, fevers, chills, chest pain, SOB, extremity pain or swelling, dysuria, hematuria, changes in bowel habits.  She hasn't seen a GYN in years and denies any gynecological problems.      Ob/Gyn History:  G5P 5005  NSVDx5 full term, no complications, in [place]                LMP -                 Cycle Length - regular q 28days, last for 4 days, normal in amount, associated with mild pain relieved with Ibuprofen  Denies history of ovarian cysts, uterine fibroids, abnormal paps, or STIs  Last Pap Smear - never  Last Mammogram - never  Last Colonoscopy -never      Denies the following: constitutional symptoms, visual symptoms, cardiovascular symptoms, respiratory symptoms, GI symptoms, musculoskeletal symptoms, skin symptoms, neurologic symptoms, hematologic symptoms, allergic symptoms, psychiatric symptoms  Except any pertinent positives listed.     PAST MEDICAL & SURGICAL HISTORY:  Pyelonephritis  History of leg vein surgery (phlebitis)     Home Medications: Tylenol PRN for the pain    Allergies: No Known Allergies    FAMILY HISTORY:  No pertinent family history in first degree relatives. Denies hx of cancer    SOCIAL HISTORY: Denies cigarette use, alcohol use, or illicit drug use    Vital Signs Last 24 Hrs  T(F): 97.7 (11 Sep 2020 12:16), Max: 98.3 (10 Sep 2020 20:09)  HR: 76 (11 Sep 2020 12:16) (75 - 85)  BP: 107/60 (11 Sep 2020 12:16) (89/50 - 107/60)  RR: 18 (11 Sep 2020 12:16) (18 - 18)    General Appearance - AAOx3, NAD  Heart - S1S2 regular rate and rhythm  Lung - CTA Bilaterally  Abdomen - Soft, nontender, nondistended, no rebound, no rigidity, no guarding, bowel sounds present    GYN/Pelvis:  External genitalia appears normal  Speculum: Cervix appears normal, no discharge, no vaginal bleeding, no lesions seen  Bimanual: Cervix is closed, no CMT, uterus 10cm in size, mobile, anteriorly palpated solid structure, no adnexal masses or tenderness.  LABS:                               8.3    4.41  )-----------( 292      ( 11 Sep 2020 06:26 )             30.4                         8.4    4.96  )-----------( 281      ( 10 Sep 2020 06:09 )             30.1                         8.0    5.91  )-----------( 288      ( 09 Sep 2020 18:05 )             28.9             133<L>  |  102  |  5<L>  ----------------------------<  272<H>  3.5   |  24  |  <0.5<L>    Ca    8.8      11 Sep 2020 06:26  Mg     1.9         TPro  6.3  /  Alb  3.6  /  TBili  0.5  /  DBili  x   /  AST  40  /  ALT  28  /  AlkPhos  62      PT/INR - ( 11 Sep 2020 11:08 )   PT: 14.00 sec;   INR: 1.22 ratio         PTT - ( 11 Sep 2020 11:08 )  PTT:32.9 sec  Urinalysis Basic - ( 09 Sep 2020 19:25 )    Color: Light Yellow / Appearance: Clear / S.035 / pH: x  Gluc: x / Ketone: Trace  / Bili: Negative / Urobili: <2 mg/dL   Blood: x / Protein: Negative / Nitrite: Positive   Leuk Esterase: Negative / RBC: 1 /HPF / WBC 3 /HPF   Sq Epi: x / Non Sq Epi: 1 /HPF / Bacteria: Many        Culture - Urine (collected 20 @ 19:25)  Source: .Urine Clean Catch (Midstream)  Preliminary Report (20 @ 09:08):    >100,000 CFU/ml Escherichia coli        RADIOLOGY & ADDITIONAL STUDIES:  EXAM:  CT ABDOMEN AND PELVIS IC            PROCEDURE DATE:  2020    INTERPRETATION:  CLINICAL STATEMENT: Left flank pain.      COMPARISON CT: CT abdomen and pelvis 5/10/2020.    FINDINGS:  LOWER CHEST: Acute right lower lobe pulmonary emboli in several subsegmental branches (series 5 image 40, 46 and 56).  HEPATOBILIARY: Marked diffuse hepatic steatosis.  SPLEEN: Unremarkable.  PANCREAS: Unremarkable.  ADRENAL GLANDS: Unremarkable.  KIDNEYS: Symmetric renal enhancement. No hydronephrosis.  ABDOMINOPELVIC NODES: No lymphadenopathy  PELVIC ORGANS: Heterogeneous uterus with a 5.6 cm subserosal fibroid along the left uterine body . Uterus exerts mass effect and displaces urinary bladder intothe right hemipelvis. Prominent left gonadal vein measuring up to 8 mm.  PERITONEUM/MESENTERY/BOWEL: No evidence of bowel obstruction, pneumoperitoneum, or ascites. Unremarkable appendix. Questionable rectal wall thickening and mild perirectal fat stranding and few subcentimeter lymph nodes (series 5 image 367). Ill-defined presacral soft tissue thickening/edema, new since May 10, 2020 ( series 7 images 36).  BONES/SOFT TISSUES: Unremarkable.    IMPRESSION:  1.  * Acute right lower lobe subsegmental pulmonary emboli.  2.  Questionable rectal wall thickening with mild perirectal inflammatory changes and ill-defined presacral soft tissue thickening/edema. Findings may represent proctitis in the appropriate clinical setting.  3.  Hepatic steatosis Chief Complaint: flank pain resolved    50y P5 with LMP  presented to the ED with flank pain, was found to have a pulmonary embolism admitted to medicine for treatment of cystitis and PE. GYN consulted for CT findings of fibroid uterus with mass effect on urinary bladder. Patient seen and evaluated at bedside. She feels well now. She reports she presented with left flank pain radiating to the groin, now resolved. She reports unintentional weight loss for the past few months. She denies any abdominal pain, vaginal bleeding, vaginal discharge, fevers, chills, chest pain, SOB, extremity pain or swelling, dysuria, hematuria, changes in bowel habits.  She hasn't seen a GYN in years and denies any gynecological problems.      Ob/Gyn History:  G5P 5005  NSVDx5 full term, no complications, in [place]                LMP -                 Cycle Length - regular q 28days, last for 4 days, normal in amount, associated with mild pain relieved with Ibuprofen  Denies history of ovarian cysts, uterine fibroids, abnormal paps, or STIs  Last Pap Smear - never  Last Mammogram - never  Last Colonoscopy -never      Denies the following: constitutional symptoms, visual symptoms, cardiovascular symptoms, respiratory symptoms, GI symptoms, musculoskeletal symptoms, skin symptoms, neurologic symptoms, hematologic symptoms, allergic symptoms, psychiatric symptoms  Except any pertinent positives listed.     PAST MEDICAL & SURGICAL HISTORY:  Pyelonephritis  History of leg vein surgery (phlebitis)   Newly diagnosed diabetes mellitus    Home Medications: Tylenol PRN for the pain    Allergies: No Known Allergies    FAMILY HISTORY:  No pertinent family history in first degree relatives. Denies hx of cancer    SOCIAL HISTORY: Denies cigarette use, alcohol use, or illicit drug use    Vital Signs Last 24 Hrs  T(F): 97.7 (11 Sep 2020 12:16), Max: 98.3 (10 Sep 2020 20:09)  HR: 76 (11 Sep 2020 12:16) (75 - 85)  BP: 107/60 (11 Sep 2020 12:16) (89/50 - 107/60)  RR: 18 (11 Sep 2020 12:16) (18 - 18)    General Appearance - AAOx3, NAD  Heart - S1S2 regular rate and rhythm  Lung - CTA Bilaterally  Abdomen - Soft, nontender, nondistended, no rebound, no rigidity, no guarding, bowel sounds present    GYN/Pelvis:  External genitalia appears normal  Speculum: Cervix appears normal, no discharge, no vaginal bleeding, no lesions seen  Bimanual: Cervix is closed, no CMT, uterus 10cm in size, mobile, anteriorly palpated solid structure, no adnexal masses or tenderness.  LABS:                               8.3    4.41  )-----------( 292      ( 11 Sep 2020 06:26 )             30.4                         8.4    4.96  )-----------( 281      ( 10 Sep 2020 06:09 )             30.1                         8.0    5.91  )-----------( 288      ( 09 Sep 2020 18:05 )             28.9             133<L>  |  102  |  5<L>  ----------------------------<  272<H>  3.5   |  24  |  <0.5<L>    Ca    8.8      11 Sep 2020 06:26  Mg     1.9         TPro  6.3  /  Alb  3.6  /  TBili  0.5  /  DBili  x   /  AST  40  /  ALT  28  /  AlkPhos  62      PT/INR - ( 11 Sep 2020 11:08 )   PT: 14.00 sec;   INR: 1.22 ratio         PTT - ( 11 Sep 2020 11:08 )  PTT:32.9 sec  Urinalysis Basic - ( 09 Sep 2020 19:25 )    Color: Light Yellow / Appearance: Clear / S.035 / pH: x  Gluc: x / Ketone: Trace  / Bili: Negative / Urobili: <2 mg/dL   Blood: x / Protein: Negative / Nitrite: Positive   Leuk Esterase: Negative / RBC: 1 /HPF / WBC 3 /HPF   Sq Epi: x / Non Sq Epi: 1 /HPF / Bacteria: Many        Culture - Urine (collected 20 @ 19:25)  Source: .Urine Clean Catch (Midstream)  Preliminary Report (20 @ 09:08):    >100,000 CFU/ml Escherichia coli        RADIOLOGY & ADDITIONAL STUDIES:  EXAM:  CT ABDOMEN AND PELVIS IC            PROCEDURE DATE:  2020    INTERPRETATION:  CLINICAL STATEMENT: Left flank pain.      COMPARISON CT: CT abdomen and pelvis 5/10/2020.    FINDINGS:  LOWER CHEST: Acute right lower lobe pulmonary emboli in several subsegmental branches (series 5 image 40, 46 and 56).  HEPATOBILIARY: Marked diffuse hepatic steatosis.  SPLEEN: Unremarkable.  PANCREAS: Unremarkable.  ADRENAL GLANDS: Unremarkable.  KIDNEYS: Symmetric renal enhancement. No hydronephrosis.  ABDOMINOPELVIC NODES: No lymphadenopathy  PELVIC ORGANS: Heterogeneous uterus with a 5.6 cm subserosal fibroid along the left uterine body . Uterus exerts mass effect and displaces urinary bladder intothe right hemipelvis. Prominent left gonadal vein measuring up to 8 mm.  PERITONEUM/MESENTERY/BOWEL: No evidence of bowel obstruction, pneumoperitoneum, or ascites. Unremarkable appendix. Questionable rectal wall thickening and mild perirectal fat stranding and few subcentimeter lymph nodes (series 5 image 367). Ill-defined presacral soft tissue thickening/edema, new since May 10, 2020 ( series 7 images 36).  BONES/SOFT TISSUES: Unremarkable.    IMPRESSION:  1.  * Acute right lower lobe subsegmental pulmonary emboli.  2.  Questionable rectal wall thickening with mild perirectal inflammatory changes and ill-defined presacral soft tissue thickening/edema. Findings may represent proctitis in the appropriate clinical setting.  3.  Hepatic steatosis

## 2020-09-11 NOTE — PROGRESS NOTE ADULT - ASSESSMENT
[place] speaking, 50F w/ PMHx of Pyelonephritis presents with c/o acute onset of Lt flank pain with radiation to Lt groin.    # Lt flank pain with radiation to LLQ/groin likely 2/2 to proctitis and cystitis, Pyelonephritis less likely  # Lactic Acidosis   - sepsis not present on admission  - afebrile, no leukocytosis, UA positive  - f/u Blood and Urine cx  - start Cipro and flagyl    # Acute PE (unprovoked)  - CTAP: acute Rt lower lobe subsegmental PE, Rectal wall thickening likely proctitis and hepatic steatosis  - Trops negative x1, EKG NSR  - No family history of hypercoagulable disorder   - start Lovenox therapeutic  - f/u CTA of chest, VA duplex  - will need hypercoagulable w/u  - Telemetry    # Microcytic Anemia  - will send Anemia w/u    # Hepatic steatosis  - denies alcohol use  - f/u hepatitis panel and lipid panel    #Hyperglycemia on BMP  - f/u A1C, if FS >180 start basal bolus coverage    # DVT ppx - lovenox  # GI ppx - not indicated  # activity - IAT  # diet - soft  Full code     Acute  PE  subsegmental PE  Low Risk   > Unsure  if provoked or unprovoked   > Unsure  if related  Uterus Mass   Doubt Cystitis   Doubt  DKA  Hepatic steatosis w/o ETOH use  Heterogeneous uterus with a 5.6 cm subserosal fibroid along the left uterine body pressing on the body.  Acute  likely  Colitis Questionable rectal wall thickening with mild perirectal inflammatory changes and ill-defined presacral soft tissue thickening/edema.  Likely Undiagnosed DM   HO Pyonephritis now resolved   LUIS FELIPE      Plan  Ob/Gyn consulted  for  possible intervention planing   Venofer 200 mg x 1 then start po iron   F/u a1C  and FS    Continue therapeutic Lovenox  f/u VA duplex official read  Monitor H/H  C/w Abx  for now Cipro/flagyl short  course   f/u beta hydroxybutyrate [place] speaking, 50F w/ PMHx of Pyelonephritis presents with c/o acute onset of Lt flank pain with radiation to Lt groin.    # Lt flank pain with radiation to LLQ/groin likely 2/2 to proctitis and cystitis, Pyelonephritis less likely  # Lactic Acidosis   Vitals have been stable throughout  afebrile, no leukocytosis, UA positive  pt currently asymptomatic  UA and Ucx are positive for ecoli  Lactate 2.3-->1.5  - d/c Cipro and flagyl    # Acute PE  Unprovoked vs provoked from uterine mass  - CTAP: acute Rt lower lobe subsegmental PE, Rectal wall thickening likely proctitis and hepatic steatosis  - CTA of chest:  Right lower lobe subsegmental pulmonary emboli. Questionable left lower lobe subsegmental pulmonary emboli  - VA duplex: Chronic left common femoral, femoral and popliteal vein DVT  - Trops negative x3, EKG NSR  - No family history of hypercoagulable disorder   - c/w therapeutic Lovenox  - Patient has no insurance so she is being transitioned to coumadin--> 5mg today  - hypercoagulable w/u sent--> negative so far  - c/w Telemetry    #Newly diagnosed diabetes  Patient has not seen a PCP in a long time  HbA1C 10.3  FS in the high 200s, low 300s  - Started on basal/bolus insulin and sliding scale  - fs x4  - needs diabetes education  - will d/c patient on metformin  - f/u beta hydroxybuterate    #Uterine mass  Heterogeneous uterus with a 5.6 cm subserosal fibroid along the left uterine body pressing on the body  Might be causing the PE  OBGYN consulted to determine if any intervention should be done inpatient  -f/u obgyn recs    # Microcytic Anemia  Consistent with iron deficiency anemia  s/p venofer x1  - c/w PO iron    #Hypotension  sBP has been between 97 and 103  Possibly baseline  -Will monitor for now    # Hepatic steatosis  Denies alcohol use  hepatitis panel and LFT negative  - Will continue to monitor    # DVT ppx - lovenox  # GI ppx - not indicated  # activity - IAT  # diet - soft consistent carbs  Full code [place] speaking, 50F w/ PMHx of Pyelonephritis presents with c/o acute onset of Lt flank pain with radiation to Lt groin.    # Lt flank pain with radiation to LLQ/groin likely 2/2 to proctitis and cystitis, Pyelonephritis less likely  # Lactic Acidosis   Vitals have been stable throughout  afebrile, no leukocytosis, UA positive  pt currently asymptomatic  UA and Ucx are positive for ecoli  Lactate 2.3-->1.5  - d/c Cipro and flagyl    # Acute PE  Unprovoked vs provoked from uterine mass  - CTAP: acute Rt lower lobe subsegmental PE, Rectal wall thickening likely proctitis and hepatic steatosis  - CTA of chest:  Right lower lobe subsegmental pulmonary emboli. Questionable left lower lobe subsegmental pulmonary emboli  - VA duplex: Chronic left common femoral, femoral and popliteal vein DVT  - Trops negative x3, EKG NSR  - No family history of hypercoagulable disorder   - c/w therapeutic Lovenox  - Patient has no insurance so she is being transitioned to coumadin--> 5mg today  - hypercoagulable w/u sent--> negative so far  - c/w Telemetry    #Newly diagnosed diabetes  Patient has not seen a PCP in a long time  HbA1C 10.3  FS in the high 200s, low 300s  - Started on basal/bolus insulin and sliding scale  - fs x4  - needs diabetes education  - will d/c patient on metformin  - f/u beta hydroxybuterate    #Uterine mass  Heterogeneous uterus with a 5.6 cm subserosal fibroid along the left uterine body pressing on the body  Might be causing the PE  OBGYN consulted to determine if any intervention should be done inpatient  -f/u obgyn recs  -f/u transvaginal US    # Microcytic Anemia  Consistent with iron deficiency anemia  s/p venofer x1  - c/w PO iron    #Hypotension  sBP has been between 97 and 103  Possibly baseline  -Will monitor for now    # Hepatic steatosis  Denies alcohol use  hepatitis panel and LFT negative  - Will continue to monitor    # DVT ppx - lovenox  # GI ppx - not indicated  # activity - IAT  # diet - soft consistent carbs  Full code

## 2020-09-11 NOTE — PROGRESS NOTE ADULT - SUBJECTIVE AND OBJECTIVE BOX
SUBJECTIVE:    Patient is a 50y old Female who presents with a chief complaint of Left flank pain (11 Sep 2020 11:09)    Currently admitted to medicine with the primary diagnosis of Pulmonary embolism     Today is hospital day 2d. This morning she is resting comfortably in bed and reports no new issues or overnight events.     PAST MEDICAL & SURGICAL HISTORY  Pyelonephritis  No significant past surgical history    SOCIAL HISTORY:  Negative for smoking/alcohol/drug use.     ALLERGIES:  No Known Allergies    MEDICATIONS:  STANDING MEDICATIONS  chlorhexidine 4% Liquid 1 Application(s) Topical <User Schedule>  dextrose 5%. 1000 milliLiter(s) IV Continuous <Continuous>  dextrose 50% Injectable 12.5 Gram(s) IV Push once  dextrose 50% Injectable 25 Gram(s) IV Push once  dextrose 50% Injectable 25 Gram(s) IV Push once  enoxaparin Injectable 60 milliGRAM(s) SubCutaneous two times a day  ferrous    sulfate 325 milliGRAM(s) Oral daily  influenza   Vaccine 0.5 milliLiter(s) IntraMuscular once  insulin glargine Injectable (LANTUS) 15 Unit(s) SubCutaneous at bedtime  insulin lispro (HumaLOG) corrective regimen sliding scale   SubCutaneous three times a day before meals  insulin lispro Injectable (HumaLOG) 5 Unit(s) SubCutaneous three times a day before meals  ketorolac   Injectable 30 milliGRAM(s) IV Push once  morphine  - Injectable 4 milliGRAM(s) IV Push once    PRN MEDICATIONS  dextrose 40% Gel 15 Gram(s) Oral once PRN  glucagon  Injectable 1 milliGRAM(s) IntraMuscular once PRN    VITALS:   T(F): 98  HR: 75  BP: 97/55  RR: 18  SpO2: 98%    LABS:                        8.3    4.41  )-----------( 292      ( 11 Sep 2020 06:26 )             30.4     09-11    133<L>  |  102  |  5<L>  ----------------------------<  272<H>  3.5   |  24  |  <0.5<L>    Ca    8.8      11 Sep 2020 06:26  Mg     1.9         TPro  6.3  /  Alb  3.6  /  TBili  0.5  /  DBili  x   /  AST  40  /  ALT  28  /  AlkPhos  62      PT/INR - ( 09 Sep 2020 18:05 )   PT: 13.20 sec;   INR: 1.15 ratio         PTT - ( 09 Sep 2020 18:05 )  PTT:25.3 sec  Urinalysis Basic - ( 09 Sep 2020 19:25 )    Color: Light Yellow / Appearance: Clear / S.035 / pH: x  Gluc: x / Ketone: Trace  / Bili: Negative / Urobili: <2 mg/dL   Blood: x / Protein: Negative / Nitrite: Positive   Leuk Esterase: Negative / RBC: 1 /HPF / WBC 3 /HPF   Sq Epi: x / Non Sq Epi: 1 /HPF / Bacteria: Many            Culture - Urine (collected 09 Sep 2020 19:25)  Source: .Urine Clean Catch (Midstream)  Preliminary Report (11 Sep 2020 09:08):    >100,000 CFU/ml Escherichia coli      CARDIAC MARKERS ( 10 Sep 2020 00:50 )  x     / <0.01 ng/mL / x     / x     / <1.0 ng/mL  CARDIAC MARKERS ( 09 Sep 2020 22:10 )  x     / <0.01 ng/mL / x     / x     / x          RADIOLOGY:    PHYSICAL EXAM:  GEN: No acute distress  LUNGS: Clear to auscultation bilaterally   HEART: Regular  ABD: Soft, non-tender, non-distended.  EXT: NC/NC/NE/2+PP/GUTIERREZ/Skin Intact.   NEURO: AAOX3  HEENT: Hearing loss bilateral

## 2020-09-12 LAB
-  AMIKACIN: SIGNIFICANT CHANGE UP
-  AMOXICILLIN/CLAVULANIC ACID: SIGNIFICANT CHANGE UP
-  AMPICILLIN/SULBACTAM: SIGNIFICANT CHANGE UP
-  AMPICILLIN: SIGNIFICANT CHANGE UP
-  AZTREONAM: SIGNIFICANT CHANGE UP
-  CEFAZOLIN: SIGNIFICANT CHANGE UP
-  CEFEPIME: SIGNIFICANT CHANGE UP
-  CEFOXITIN: SIGNIFICANT CHANGE UP
-  CEFTRIAXONE: SIGNIFICANT CHANGE UP
-  CIPROFLOXACIN: SIGNIFICANT CHANGE UP
-  ERTAPENEM: SIGNIFICANT CHANGE UP
-  GENTAMICIN: SIGNIFICANT CHANGE UP
-  IMIPENEM: SIGNIFICANT CHANGE UP
-  LEVOFLOXACIN: SIGNIFICANT CHANGE UP
-  MEROPENEM: SIGNIFICANT CHANGE UP
-  NITROFURANTOIN: SIGNIFICANT CHANGE UP
-  PIPERACILLIN/TAZOBACTAM: SIGNIFICANT CHANGE UP
-  TIGECYCLINE: SIGNIFICANT CHANGE UP
-  TOBRAMYCIN: SIGNIFICANT CHANGE UP
-  TRIMETHOPRIM/SULFAMETHOXAZOLE: SIGNIFICANT CHANGE UP
ANION GAP SERPL CALC-SCNC: 12 MMOL/L — SIGNIFICANT CHANGE UP (ref 7–14)
APTT BLD: 31.4 SEC — SIGNIFICANT CHANGE UP (ref 27–39.2)
BASOPHILS # BLD AUTO: 0.02 K/UL — SIGNIFICANT CHANGE UP (ref 0–0.2)
BASOPHILS NFR BLD AUTO: 0.4 % — SIGNIFICANT CHANGE UP (ref 0–1)
BUN SERPL-MCNC: 10 MG/DL — SIGNIFICANT CHANGE UP (ref 10–20)
CALCIUM SERPL-MCNC: 8.8 MG/DL — SIGNIFICANT CHANGE UP (ref 8.5–10.1)
CHLORIDE SERPL-SCNC: 101 MMOL/L — SIGNIFICANT CHANGE UP (ref 98–110)
CO2 SERPL-SCNC: 25 MMOL/L — SIGNIFICANT CHANGE UP (ref 17–32)
CREAT SERPL-MCNC: 0.5 MG/DL — LOW (ref 0.7–1.5)
CULTURE RESULTS: SIGNIFICANT CHANGE UP
EOSINOPHIL # BLD AUTO: 0.11 K/UL — SIGNIFICANT CHANGE UP (ref 0–0.7)
EOSINOPHIL NFR BLD AUTO: 2.1 % — SIGNIFICANT CHANGE UP (ref 0–8)
GLUCOSE BLDC GLUCOMTR-MCNC: 229 MG/DL — HIGH (ref 70–99)
GLUCOSE BLDC GLUCOMTR-MCNC: 261 MG/DL — HIGH (ref 70–99)
GLUCOSE BLDC GLUCOMTR-MCNC: 263 MG/DL — HIGH (ref 70–99)
GLUCOSE BLDC GLUCOMTR-MCNC: 323 MG/DL — HIGH (ref 70–99)
GLUCOSE SERPL-MCNC: 278 MG/DL — HIGH (ref 70–99)
HCT VFR BLD CALC: 33.8 % — LOW (ref 37–47)
HGB BLD-MCNC: 8.9 G/DL — LOW (ref 12–16)
IMM GRANULOCYTES NFR BLD AUTO: 2.1 % — HIGH (ref 0.1–0.3)
INR BLD: 1.19 RATIO — SIGNIFICANT CHANGE UP (ref 0.65–1.3)
LYMPHOCYTES # BLD AUTO: 1.17 K/UL — LOW (ref 1.2–3.4)
LYMPHOCYTES # BLD AUTO: 22.8 % — SIGNIFICANT CHANGE UP (ref 20.5–51.1)
MAGNESIUM SERPL-MCNC: 2 MG/DL — SIGNIFICANT CHANGE UP (ref 1.8–2.4)
MCHC RBC-ENTMCNC: 19.4 PG — LOW (ref 27–31)
MCHC RBC-ENTMCNC: 26.3 G/DL — LOW (ref 32–37)
MCV RBC AUTO: 73.6 FL — LOW (ref 81–99)
METHOD TYPE: SIGNIFICANT CHANGE UP
MONOCYTES # BLD AUTO: 0.45 K/UL — SIGNIFICANT CHANGE UP (ref 0.1–0.6)
MONOCYTES NFR BLD AUTO: 8.8 % — SIGNIFICANT CHANGE UP (ref 1.7–9.3)
NEUTROPHILS # BLD AUTO: 3.27 K/UL — SIGNIFICANT CHANGE UP (ref 1.4–6.5)
NEUTROPHILS NFR BLD AUTO: 63.8 % — SIGNIFICANT CHANGE UP (ref 42.2–75.2)
NRBC # BLD: 0 /100 WBCS — SIGNIFICANT CHANGE UP (ref 0–0)
ORGANISM # SPEC MICROSCOPIC CNT: SIGNIFICANT CHANGE UP
ORGANISM # SPEC MICROSCOPIC CNT: SIGNIFICANT CHANGE UP
PLATELET # BLD AUTO: 334 K/UL — SIGNIFICANT CHANGE UP (ref 130–400)
POTASSIUM SERPL-MCNC: 3.7 MMOL/L — SIGNIFICANT CHANGE UP (ref 3.5–5)
POTASSIUM SERPL-SCNC: 3.7 MMOL/L — SIGNIFICANT CHANGE UP (ref 3.5–5)
PROTHROM AB SERPL-ACNC: 13.7 SEC — HIGH (ref 9.95–12.87)
RBC # BLD: 4.59 M/UL — SIGNIFICANT CHANGE UP (ref 4.2–5.4)
RBC # FLD: 25.8 % — HIGH (ref 11.5–14.5)
SODIUM SERPL-SCNC: 138 MMOL/L — SIGNIFICANT CHANGE UP (ref 135–146)
SPECIMEN SOURCE: SIGNIFICANT CHANGE UP
WBC # BLD: 5.13 K/UL — SIGNIFICANT CHANGE UP (ref 4.8–10.8)
WBC # FLD AUTO: 5.13 K/UL — SIGNIFICANT CHANGE UP (ref 4.8–10.8)

## 2020-09-12 PROCEDURE — 99233 SBSQ HOSP IP/OBS HIGH 50: CPT

## 2020-09-12 PROCEDURE — 76856 US EXAM PELVIC COMPLETE: CPT | Mod: 26

## 2020-09-12 RX ADMIN — ENOXAPARIN SODIUM 60 MILLIGRAM(S): 100 INJECTION SUBCUTANEOUS at 05:31

## 2020-09-12 RX ADMIN — Medication 5 UNIT(S): at 11:30

## 2020-09-12 RX ADMIN — Medication 5 UNIT(S): at 07:32

## 2020-09-12 RX ADMIN — Medication 325 MILLIGRAM(S): at 11:30

## 2020-09-12 RX ADMIN — Medication 2: at 16:58

## 2020-09-12 RX ADMIN — ENOXAPARIN SODIUM 60 MILLIGRAM(S): 100 INJECTION SUBCUTANEOUS at 16:57

## 2020-09-12 RX ADMIN — Medication 3: at 07:32

## 2020-09-12 RX ADMIN — INSULIN GLARGINE 15 UNIT(S): 100 INJECTION, SOLUTION SUBCUTANEOUS at 21:36

## 2020-09-12 RX ADMIN — Medication 5 UNIT(S): at 16:58

## 2020-09-12 RX ADMIN — Medication 3: at 11:31

## 2020-09-12 NOTE — PROGRESS NOTE ADULT - SUBJECTIVE AND OBJECTIVE BOX
TWILA NATION  50y, Female  Allergy: No Known Allergies    Hospital Day: 3d    Patient seen and examined earlier today. No acute events overnight.    PMH/PSH:  PAST MEDICAL & SURGICAL HISTORY:  Pyelonephritis    No significant past surgical history    VITALS:  T(F): 97.9 (09-11-20 @ 20:25), Max: 97.9 (09-11-20 @ 20:25)  HR: 77 (09-11-20 @ 20:25)  BP: 114/59 (09-11-20 @ 20:25) (107/60 - 114/59)  RR: 18 (09-11-20 @ 20:25)  SpO2: 98% (09-12-20 @ 10:12)    TESTS & MEASUREMENTS:  Weight (Kg): 54.4 (09-10-20 @ 16:58)  BMI (kg/m2): 22.7 (09-10)    09-10-20 @ 07:01  -  09-11-20 @ 07:00  --------------------------------------------------------  IN: 610 mL / OUT: 0 mL / NET: 610 mL    09-11-20 @ 07:01  -  09-12-20 @ 07:00  --------------------------------------------------------  IN: 1340 mL / OUT: 0 mL / NET: 1340 mL                            8.9    5.13  )-----------( 334      ( 12 Sep 2020 05:56 )             33.8     PT/INR - ( 12 Sep 2020 05:56 )   PT: 13.70 sec;   INR: 1.19 ratio         PTT - ( 12 Sep 2020 05:56 )  PTT:31.4 sec  09-12    138  |  101  |  10  ----------------------------<  278<H>  3.7   |  25  |  0.5<L>    Ca    8.8      12 Sep 2020 05:56  Mg     2.0     09-12    TPro  6.3  /  Alb  3.6  /  TBili  0.5  /  DBili  x   /  AST  40  /  ALT  28  /  AlkPhos  62  09-11    LIVER FUNCTIONS - ( 11 Sep 2020 06:26 )  Alb: 3.6 g/dL / Pro: 6.3 g/dL / ALK PHOS: 62 U/L / ALT: 28 U/L / AST: 40 U/L / GGT: x           Culture - Urine (collected 09-09-20 @ 19:25)  Source: .Urine Clean Catch (Midstream)  Final Report (09-12-20 @ 09:50):    >100,000 CFU/ml Escherichia coli  Organism: Escherichia coli (09-12-20 @ 09:50)  Organism: Escherichia coli (09-12-20 @ 09:50)      -  Amikacin: S <=16      -  Amoxicillin/Clavulanic Acid: S <=8/4      -  Ampicillin: S <=8 These ampicillin results predict results for amoxicillin      -  Ampicillin/Sulbactam: S <=4/2 Enterobacter, Citrobacter, and Serratia may develop resistance during prolonged therapy (3-4 days)      -  Aztreonam: S <=4      -  Cefazolin: S <=2 (MIC_CL_COM_ENTERIC_CEFAZU) For uncomplicated UTI with K. pneumoniae, E. coli, or P. mirablis: ALIDA <=16 is sensitive and ALIDA >=32 is resistant. This also predicts results for oral agents cefaclor, cefdinir, cefpodoxime, cefprozil, cefuroxime axetil, cephalexin and locarbef for uncomplicated UTI. Note that some isolates may be susceptible to these agents while testing resistant to cefazolin.      -  Cefepime: S <=2      -  Cefoxitin: S <=8      -  Ceftriaxone: S <=1 Enterobacter, Citrobacter, and Serratia may develop resistance during prolonged therapy      -  Ciprofloxacin: S <=0.25      -  Ertapenem: S <=0.5      -  Gentamicin: S <=2      -  Imipenem: S <=1      -  Levofloxacin: S <=0.5      -  Meropenem: S <=1      -  Nitrofurantoin: S <=32 Should not be used to treat pyelonephritis      -  Piperacillin/Tazobactam: S <=8      -  Tigecycline: S <=2      -  Tobramycin: S <=2      -  Trimethoprim/Sulfamethoxazole: S <=0.5/9.5      Method Type: ALIDA    RECENT DIAGNOSTIC ORDERS:  US Pelvis Complete: 13:26  Exam Completed (09-12-20 @ 09:46)  Activated Partial Thromboplastin Time:  Start Date:14-Sep-2020. AM Sched. Collection:14-Sep-2020 04:30 (09-11-20 @ 12:14)  Prothrombin Time and INR, Plasma:  Start Date:14-Sep-2020. AM Sched. Collection:14-Sep-2020 04:30 (09-11-20 @ 12:14)  Activated Partial Thromboplastin Time:  Start Date:13-Sep-2020. AM Sched. Collection:13-Sep-2020 04:30 (09-11-20 @ 12:14)  Prothrombin Time and INR, Plasma:  Start Date:13-Sep-2020. AM Sched. Collection:13-Sep-2020 04:30 (09-11-20 @ 12:14)  Magnesium, Serum: Repeat From: 11-Sep-2020 12:12 To: 14-Sep-2020 04:30, Every 1 day(s) (09-11-20 @ 12:14)  Basic Metabolic Panel: Repeat From: 11-Sep-2020 12:12 To: 14-Sep-2020 04:30, Every 1 day(s) (09-11-20 @ 12:14)  Complete Blood Count + Automated Diff: Repeat From: 11-Sep-2020 12:12 To: 14-Sep-2020 04:30, Every 1 day(s) (09-11-20 @ 12:14)  Diet, Mechanical Soft:   Consistent Carbohydrate No Snacks (09-11-20 @ 12:10)      MEDICATIONS:  MEDICATIONS  (STANDING):  chlorhexidine 4% Liquid 1 Application(s) Topical <User Schedule>  dextrose 5%. 1000 milliLiter(s) (50 mL/Hr) IV Continuous <Continuous>  dextrose 50% Injectable 12.5 Gram(s) IV Push once  dextrose 50% Injectable 25 Gram(s) IV Push once  dextrose 50% Injectable 25 Gram(s) IV Push once  enoxaparin Injectable 60 milliGRAM(s) SubCutaneous two times a day  ferrous    sulfate 325 milliGRAM(s) Oral daily  influenza   Vaccine 0.5 milliLiter(s) IntraMuscular once  insulin glargine Injectable (LANTUS) 15 Unit(s) SubCutaneous at bedtime  insulin lispro (HumaLOG) corrective regimen sliding scale   SubCutaneous three times a day before meals  insulin lispro Injectable (HumaLOG) 5 Unit(s) SubCutaneous three times a day before meals  ketorolac   Injectable 30 milliGRAM(s) IV Push once  morphine  - Injectable 4 milliGRAM(s) IV Push once    MEDICATIONS  (PRN):  dextrose 40% Gel 15 Gram(s) Oral once PRN Blood Glucose LESS THAN 70 milliGRAM(s)/deciliter  glucagon  Injectable 1 milliGRAM(s) IntraMuscular once PRN Glucose LESS THAN 70 milligrams/deciliter    REVIEW OF SYSTEMS:  All other review of systems is negative unless indicated above.     PHYSICAL EXAM:  GENERAL: NAD  HEENT: No Swelling  CHEST/LUNG: Good air entry, No wheezing  HEART: RRR, No murmurs  ABDOMEN: Soft, Bowel sounds present  EXTREMITIES:  No clubbing

## 2020-09-12 NOTE — PROGRESS NOTE ADULT - ASSESSMENT
50F w/ PMHx of Pyelonephritis presents with c/o acute onset of Lt flank pain with radiation to Lt groin. Found to have acute pulmonary embolism    #Acute PE  CT Angio 09/10: RLL subsegmental PE  O2 sat 98 on RA  - Heparin GGT  - Bridge to coumadin (pt uninsured, cannot afford DOAC)    Abdominal Pain, likely due to proctitis, resolved  Uterine fibroid  - Observe off antibiotics  - TVUS per OBGYN  - Monitor    New onset DM2  Hba1c: 10.2%  - Cont lantus/lispro 10-5-5-5  - On Discharge, metformin 1000 BID with outpatient endocrine follow-up    DVT PPX, heparin

## 2020-09-13 ENCOUNTER — TRANSCRIPTION ENCOUNTER (OUTPATIENT)
Age: 50
End: 2020-09-13

## 2020-09-13 VITALS
TEMPERATURE: 98 F | RESPIRATION RATE: 18 BRPM | HEART RATE: 81 BPM | SYSTOLIC BLOOD PRESSURE: 110 MMHG | DIASTOLIC BLOOD PRESSURE: 63 MMHG

## 2020-09-13 LAB
ANION GAP SERPL CALC-SCNC: 10 MMOL/L — SIGNIFICANT CHANGE UP (ref 7–14)
APTT BLD: 33 SEC — SIGNIFICANT CHANGE UP (ref 27–39.2)
APTT BLD: 33.5 SEC — SIGNIFICANT CHANGE UP (ref 27–39.2)
BASOPHILS # BLD AUTO: 0.03 K/UL — SIGNIFICANT CHANGE UP (ref 0–0.2)
BASOPHILS NFR BLD AUTO: 0.5 % — SIGNIFICANT CHANGE UP (ref 0–1)
BUN SERPL-MCNC: 14 MG/DL — SIGNIFICANT CHANGE UP (ref 10–20)
CALCIUM SERPL-MCNC: 8.9 MG/DL — SIGNIFICANT CHANGE UP (ref 8.5–10.1)
CHLORIDE SERPL-SCNC: 102 MMOL/L — SIGNIFICANT CHANGE UP (ref 98–110)
CO2 SERPL-SCNC: 25 MMOL/L — SIGNIFICANT CHANGE UP (ref 17–32)
CREAT SERPL-MCNC: 0.5 MG/DL — LOW (ref 0.7–1.5)
EOSINOPHIL # BLD AUTO: 0.17 K/UL — SIGNIFICANT CHANGE UP (ref 0–0.7)
EOSINOPHIL NFR BLD AUTO: 2.7 % — SIGNIFICANT CHANGE UP (ref 0–8)
GLUCOSE BLDC GLUCOMTR-MCNC: 281 MG/DL — HIGH (ref 70–99)
GLUCOSE BLDC GLUCOMTR-MCNC: 292 MG/DL — HIGH (ref 70–99)
GLUCOSE BLDC GLUCOMTR-MCNC: 426 MG/DL — HIGH (ref 70–99)
GLUCOSE SERPL-MCNC: 277 MG/DL — HIGH (ref 70–99)
HCT VFR BLD CALC: 33.5 % — LOW (ref 37–47)
HGB BLD-MCNC: 9 G/DL — LOW (ref 12–16)
IMM GRANULOCYTES NFR BLD AUTO: 1.9 % — HIGH (ref 0.1–0.3)
INR BLD: 1.09 RATIO — SIGNIFICANT CHANGE UP (ref 0.65–1.3)
INR BLD: 1.18 RATIO — SIGNIFICANT CHANGE UP (ref 0.65–1.3)
LYMPHOCYTES # BLD AUTO: 1.46 K/UL — SIGNIFICANT CHANGE UP (ref 1.2–3.4)
LYMPHOCYTES # BLD AUTO: 23.3 % — SIGNIFICANT CHANGE UP (ref 20.5–51.1)
MAGNESIUM SERPL-MCNC: 2 MG/DL — SIGNIFICANT CHANGE UP (ref 1.8–2.4)
MCHC RBC-ENTMCNC: 19.8 PG — LOW (ref 27–31)
MCHC RBC-ENTMCNC: 26.9 G/DL — LOW (ref 32–37)
MCV RBC AUTO: 73.8 FL — LOW (ref 81–99)
MONOCYTES # BLD AUTO: 0.56 K/UL — SIGNIFICANT CHANGE UP (ref 0.1–0.6)
MONOCYTES NFR BLD AUTO: 8.9 % — SIGNIFICANT CHANGE UP (ref 1.7–9.3)
NEUTROPHILS # BLD AUTO: 3.93 K/UL — SIGNIFICANT CHANGE UP (ref 1.4–6.5)
NEUTROPHILS NFR BLD AUTO: 62.7 % — SIGNIFICANT CHANGE UP (ref 42.2–75.2)
NRBC # BLD: 0 /100 WBCS — SIGNIFICANT CHANGE UP (ref 0–0)
PLATELET # BLD AUTO: 344 K/UL — SIGNIFICANT CHANGE UP (ref 130–400)
POTASSIUM SERPL-MCNC: 4.1 MMOL/L — SIGNIFICANT CHANGE UP (ref 3.5–5)
POTASSIUM SERPL-SCNC: 4.1 MMOL/L — SIGNIFICANT CHANGE UP (ref 3.5–5)
PROTHROM AB SERPL-ACNC: 12.5 SEC — SIGNIFICANT CHANGE UP (ref 9.95–12.87)
PROTHROM AB SERPL-ACNC: 13.6 SEC — HIGH (ref 9.95–12.87)
RBC # BLD: 4.54 M/UL — SIGNIFICANT CHANGE UP (ref 4.2–5.4)
RBC # FLD: 26.1 % — HIGH (ref 11.5–14.5)
SODIUM SERPL-SCNC: 137 MMOL/L — SIGNIFICANT CHANGE UP (ref 135–146)
WBC # BLD: 6.27 K/UL — SIGNIFICANT CHANGE UP (ref 4.8–10.8)
WBC # FLD AUTO: 6.27 K/UL — SIGNIFICANT CHANGE UP (ref 4.8–10.8)

## 2020-09-13 PROCEDURE — 99238 HOSP IP/OBS DSCHRG MGMT 30/<: CPT

## 2020-09-13 RX ORDER — WARFARIN SODIUM 2.5 MG/1
7.5 TABLET ORAL AT BEDTIME
Refills: 0 | Status: DISCONTINUED | OUTPATIENT
Start: 2020-09-13 | End: 2020-09-13

## 2020-09-13 RX ORDER — FERROUS SULFATE 325(65) MG
1 TABLET ORAL
Qty: 30 | Refills: 0
Start: 2020-09-13 | End: 2020-10-12

## 2020-09-13 RX ORDER — WARFARIN SODIUM 2.5 MG/1
5 TABLET ORAL ONCE
Refills: 0 | Status: COMPLETED | OUTPATIENT
Start: 2020-09-13 | End: 2020-09-13

## 2020-09-13 RX ORDER — WARFARIN SODIUM 2.5 MG/1
5 TABLET ORAL
Qty: 150 | Refills: 0
Start: 2020-09-13 | End: 2020-10-12

## 2020-09-13 RX ORDER — ENOXAPARIN SODIUM 100 MG/ML
60 INJECTION SUBCUTANEOUS
Qty: 0 | Refills: 0 | DISCHARGE
Start: 2020-09-13 | End: 2020-09-18

## 2020-09-13 RX ORDER — METFORMIN HYDROCHLORIDE 850 MG/1
1 TABLET ORAL
Qty: 60 | Refills: 0
Start: 2020-09-13 | End: 2020-10-12

## 2020-09-13 RX ADMIN — CHLORHEXIDINE GLUCONATE 1 APPLICATION(S): 213 SOLUTION TOPICAL at 05:47

## 2020-09-13 RX ADMIN — Medication 5 UNIT(S): at 07:50

## 2020-09-13 RX ADMIN — WARFARIN SODIUM 5 MILLIGRAM(S): 2.5 TABLET ORAL at 11:23

## 2020-09-13 RX ADMIN — Medication 3: at 07:50

## 2020-09-13 RX ADMIN — Medication 5 UNIT(S): at 16:45

## 2020-09-13 RX ADMIN — ENOXAPARIN SODIUM 60 MILLIGRAM(S): 100 INJECTION SUBCUTANEOUS at 05:47

## 2020-09-13 RX ADMIN — Medication 3: at 16:46

## 2020-09-13 RX ADMIN — Medication 325 MILLIGRAM(S): at 11:22

## 2020-09-13 RX ADMIN — Medication 6: at 11:23

## 2020-09-13 RX ADMIN — ENOXAPARIN SODIUM 60 MILLIGRAM(S): 100 INJECTION SUBCUTANEOUS at 16:47

## 2020-09-13 RX ADMIN — Medication 5 UNIT(S): at 11:23

## 2020-09-13 NOTE — DISCHARGE NOTE PROVIDER - CARE PROVIDER_API CALL
your pcp,   Phone: (   )    -  Fax: (   )    -  Follow Up Time: 1 week    Manoj Singletary  INTERNAL MEDICINE  [facility] [place] [address]  Phone: [phone]  Fax: [phone]  Follow Up Time: 1 week

## 2020-09-13 NOTE — PROGRESS NOTE ADULT - SUBJECTIVE AND OBJECTIVE BOX
TWILA NATION  50y, Female  Allergy: No Known Allergies    Hospital Day: 4d    Patient seen and examined earlier today. No acute events overnight.    PMH/PSH:  PAST MEDICAL & SURGICAL HISTORY:  Pyelonephritis    No significant past surgical history    VITALS:  T(F): 97.4 (09-13-20 @ 05:01), Max: 99 (09-12-20 @ 20:32)  HR: 85 (09-13-20 @ 05:01)  BP: 96/59 (09-13-20 @ 05:01) (96/59 - 107/56)  RR: 18 (09-13-20 @ 05:01)  SpO2: 98% (09-13-20 @ 08:02)    TESTS & MEASUREMENTS:  Weight (Kg):   BMI (kg/m2): 22.7 (09-10)    09-11-20 @ 07:01  -  09-12-20 @ 07:00  --------------------------------------------------------  IN: 1340 mL / OUT: 0 mL / NET: 1340 mL    09-12-20 @ 07:01  -  09-13-20 @ 07:00  --------------------------------------------------------  IN: 575 mL / OUT: 0 mL / NET: 575 mL    09-13-20 @ 07:01  -  09-13-20 @ 10:35  --------------------------------------------------------  IN: 300 mL / OUT: 0 mL / NET: 300 mL                        9.0    6.27  )-----------( 344      ( 13 Sep 2020 06:26 )             33.5     PT/INR - ( 13 Sep 2020 06:26 )   PT: 13.60 sec;   INR: 1.18 ratio         PTT - ( 13 Sep 2020 06:26 )  PTT:33.5 sec  09-13    137  |  102  |  14  ----------------------------<  277<H>  4.1   |  25  |  0.5<L>    Ca    8.9      13 Sep 2020 06:26  Mg     2.0     09-13    Culture - Urine (collected 09-09-20 @ 19:25)  Source: .Urine Clean Catch (Midstream)  Final Report (09-12-20 @ 09:50):    >100,000 CFU/ml Escherichia coli  Organism: Escherichia coli (09-12-20 @ 09:50)  Organism: Escherichia coli (09-12-20 @ 09:50)      -  Amikacin: S <=16      -  Amoxicillin/Clavulanic Acid: S <=8/4      -  Ampicillin: S <=8 These ampicillin results predict results for amoxicillin      -  Ampicillin/Sulbactam: S <=4/2 Enterobacter, Citrobacter, and Serratia may develop resistance during prolonged therapy (3-4 days)      -  Aztreonam: S <=4      -  Cefazolin: S <=2 (MIC_CL_COM_ENTERIC_CEFAZU) For uncomplicated UTI with K. pneumoniae, E. coli, or P. mirablis: ALIDA <=16 is sensitive and ALIDA >=32 is resistant. This also predicts results for oral agents cefaclor, cefdinir, cefpodoxime, cefprozil, cefuroxime axetil, cephalexin and locarbef for uncomplicated UTI. Note that some isolates may be susceptible to these agents while testing resistant to cefazolin.      -  Cefepime: S <=2      -  Cefoxitin: S <=8      -  Ceftriaxone: S <=1 Enterobacter, Citrobacter, and Serratia may develop resistance during prolonged therapy      -  Ciprofloxacin: S <=0.25      -  Ertapenem: S <=0.5      -  Gentamicin: S <=2      -  Imipenem: S <=1      -  Levofloxacin: S <=0.5      -  Meropenem: S <=1      -  Nitrofurantoin: S <=32 Should not be used to treat pyelonephritis      -  Piperacillin/Tazobactam: S <=8      -  Tigecycline: S <=2      -  Tobramycin: S <=2      -  Trimethoprim/Sulfamethoxazole: S <=0.5/9.5      Method Type: ALIDA    RECENT DIAGNOSTIC ORDERS:  Activated Partial Thromboplastin Time:  Start Date:13-Sep-2020. 16:00 (09-13-20 @ 09:53)  Prothrombin Time and INR, Plasma:  Start Date:13-Sep-2020. 16:00 (09-13-20 @ 09:53)    MEDICATIONS:  MEDICATIONS  (STANDING):  chlorhexidine 4% Liquid 1 Application(s) Topical <User Schedule>  dextrose 5%. 1000 milliLiter(s) (50 mL/Hr) IV Continuous <Continuous>  dextrose 50% Injectable 12.5 Gram(s) IV Push once  dextrose 50% Injectable 25 Gram(s) IV Push once  dextrose 50% Injectable 25 Gram(s) IV Push once  enoxaparin Injectable 60 milliGRAM(s) SubCutaneous two times a day  ferrous    sulfate 325 milliGRAM(s) Oral daily  influenza   Vaccine 0.5 milliLiter(s) IntraMuscular once  insulin glargine Injectable (LANTUS) 15 Unit(s) SubCutaneous at bedtime  insulin lispro (HumaLOG) corrective regimen sliding scale   SubCutaneous three times a day before meals  insulin lispro Injectable (HumaLOG) 5 Unit(s) SubCutaneous three times a day before meals  ketorolac   Injectable 30 milliGRAM(s) IV Push once  morphine  - Injectable 4 milliGRAM(s) IV Push once  warfarin 5 milliGRAM(s) Oral once  warfarin 7.5 milliGRAM(s) Oral at bedtime    MEDICATIONS  (PRN):  dextrose 40% Gel 15 Gram(s) Oral once PRN Blood Glucose LESS THAN 70 milliGRAM(s)/deciliter  glucagon  Injectable 1 milliGRAM(s) IntraMuscular once PRN Glucose LESS THAN 70 milligrams/deciliter    REVIEW OF SYSTEMS:  All other review of systems is negative unless indicated above.     PHYSICAL EXAM:  GENERAL: NAD  HEENT: No Swelling  CHEST/LUNG: Good air entry, No wheezing  HEART: RRR, No murmurs  ABDOMEN: Soft, Bowel sounds present  EXTREMITIES:  No clubbing

## 2020-09-13 NOTE — PROGRESS NOTE ADULT - ASSESSMENT
50F w/ PMHx of Pyelonephritis presents with c/o acute onset of Lt flank pain with radiation to Lt groin.    # Lt flank pain with radiation to LLQ/groin likely 2/2 to proctitis and cystitis, Pyelonephritis less likely  # Lactic Acidosis   Vitals have been stable throughout  afebrile, no leukocytosis, UA positive  pt currently asymptomatic  UA and Ucx are positive for ecoli  Lactate 2.3-->1.5  -Monitor off antibiotics    # Acute PE  Unprovoked vs provoked from uterine mass  - CTAP: acute Rt lower lobe subsegmental PE, Rectal wall thickening likely proctitis and hepatic steatosis  - CTA of chest:  Right lower lobe subsegmental pulmonary emboli. Questionable left lower lobe subsegmental pulmonary emboli  - VA duplex: Chronic left common femoral, femoral and popliteal vein DVT  - Trops negative x3, EKG NSR  - No family history of hypercoagulable disorder   - c/w therapeutic Lovenox  - Patient has no insurance so she is being transitioned to coumadin--> 5mg and 7.5mg today  - hypercoagulable w/u sent--> negative so far  - c/w Telemetry    #Newly diagnosed diabetes  Patient has not seen a PCP in a long time  HbA1C 10.3  FS in the high 200s, low 300s  - c/wet basal/bolus insulin and sliding scale  - fs x4  - needs diabetes education  - will d/c patient on metformin  - beta hydroxybuterate <0.2    #Uterine mass  Heterogeneous uterus with a 5.6 cm subserosal fibroid along the left uterine body pressing on the body  Might be causing the PE  OBGYN consulted to determine if any intervention should be done inpatient  -f/u obgyn recs--> Waiting for transvaginal US to result  -f/u transvaginal US    # Microcytic Anemia  Consistent with iron deficiency anemia  s/p venofer x1  - c/w PO iron    #Hypotension  sBP has been in the high 90s low 100s which appears to be baseline  -continue monitor for now    # Hepatic steatosis  Denies alcohol use  hepatitis panel and LFT negative  - Resolved    # DVT ppx - lovenox and warfarin  # GI ppx - not indicated  # activity - IAT  # diet - soft consistent carbs  Full code

## 2020-09-13 NOTE — PROGRESS NOTE ADULT - ASSESSMENT
50F w/ PMHx of Pyelonephritis presents with c/o acute onset of Lt flank pain with radiation to Lt groin. Found to have acute pulmonary embolism    #Acute PE  CT Angio 09/10: RLL subsegmental PE  O2 sat 98 on RA  - Heparin GGT  - Bridge to coumadin (pt uninsured, cannot afford DOAC)    Abdominal Pain, likely due to proctitis, resolved  Uterine fibroid  - Observe off antibiotics  - TVUS per OBGYN  - Outpt f/u  - Monitor    New onset DM2  Hba1c: 10.2%  - Cont lantus/lispro 10-5-5-5  - On Discharge, metformin 1000 BID with outpatient endocrine follow-up    DVT PPX, heparin

## 2020-09-13 NOTE — DISCHARGE NOTE PROVIDER - PROVIDER TOKENS
FREE:[LAST:[your pcp],PHONE:[(   )    -],FAX:[(   )    -],FOLLOWUP:[1 week]],PROVIDER:[TOKEN:[[id number]],FOLLOWUP:[1 week]]

## 2020-09-13 NOTE — DISCHARGE NOTE PROVIDER - NSDCMRMEDTOKEN_GEN_ALL_CORE_FT
Coumadin 5 mg oral tablet: 5 milligram(s) orally once a day (at bedtime)   enoxaparin: 60 unit(s) subcutaneous 2 times a day  FeroSul 325 mg (65 mg elemental iron) oral tablet: 1 tab(s) orally once a day  metFORMIN 1000 mg oral tablet: 1 tab(s) orally 2 times a day

## 2020-09-13 NOTE — DISCHARGE NOTE PROVIDER - NSFOLLOWUPCLINICS_GEN_ALL_ED_FT
[facility]  Coumadin  [address] [place] [address]  Phone: [phone]  Fax:   Follow Up Time: 1 week    [facility]  OB/GYN  [address] [place] [address]  Phone: [phone]  Fax:   Follow Up Time: 2 weeks

## 2020-09-13 NOTE — CHART NOTE - NSCHARTNOTEFT_GEN_A_CORE
TVUS results reviewed. 6cm fibroid visualized. Follow up outpatient with GYN.    < from: US Pelvis Complete (09.12.20 @ 10:00) >    FINDINGS:    UTERUS: [id number] x 6.6 x 6.3 cm. A 6.0 x 5.3 x 5.1 cm uterine fibroid is delineated. The endometrial echo complex measures 0.7 cm, which is normal in thickness.    RIGHT OVARY: measures 2.1 x 2.0 x 1.2 cm, and is unremarkable. Doppler flow is demonstrated to the right ovary.    LEFT OVARY: measures 1.7 x 2.0 x 1.4 cm, and contains a cyst/follicle measuring 1.2 x 1.1 x 1.2 cm. Doppler flow is demonstrated to the left ovary.    OTHER: No free fluid in the pelvis.    IMPRESSION:    6.0 cm uterine fibroid.    1.2 cm left adnexal cyst/follicle.

## 2020-09-13 NOTE — PROGRESS NOTE ADULT - SUBJECTIVE AND OBJECTIVE BOX
50F w/ PMHx of Pyelonephritis presents with c/o acute onset of Lt flank pain with radiation to Lt groin.    # Lt flank pain with radiation to LLQ/groin likely 2/2 to proctitis and cystitis, Pyelonephritis less likely  # Lactic Acidosis   Vitals have been stable throughout  afebrile, no leukocytosis, UA positive  pt currently asymptomatic  UA and Ucx are positive for ecoli  Lactate 2.3-->1.5  -Monitor off antibiotics    # Acute PE  Unprovoked vs provoked from uterine mass  - CTAP: acute Rt lower lobe subsegmental PE, Rectal wall thickening likely proctitis and hepatic steatosis  - CTA of chest:  Right lower lobe subsegmental pulmonary emboli. Questionable left lower lobe subsegmental pulmonary emboli  - VA duplex: Chronic left common femoral, femoral and popliteal vein DVT  - Trops negative x3, EKG NSR  - No family history of hypercoagulable disorder   - c/w therapeutic Lovenox  - Patient has no insurance so she is being transitioned to coumadin--> 5mg and 7.5mg today  - hypercoagulable w/u sent--> negative so far  - c/w Telemetry    #Newly diagnosed diabetes  Patient has not seen a PCP in a long time  HbA1C 10.3  FS in the high 200s, low 300s  - c/wet basal/bolus insulin and sliding scale  - fs x4  - needs diabetes education  - will d/c patient on metformin  - beta hydroxybuterate <0.2    #Uterine mass  Heterogeneous uterus with a 5.6 cm subserosal fibroid along the left uterine body pressing on the body  Might be causing the PE  OBGYN consulted to determine if any intervention should be done inpatient  -f/u obgyn recs--> Waiting for transvaginal US to result  -f/u transvaginal US    # Microcytic Anemia  Consistent with iron deficiency anemia  s/p venofer x1  - c/w PO iron    #Hypotension  sBP has been in the high 90s low 100s which appears to be baseline  -continue monitor for now    # Hepatic steatosis  Denies alcohol use  hepatitis panel and LFT negative  - Resolved    # DVT ppx - lovenox and warfarin  # GI ppx - not indicated  # activity - IAT  # diet - soft consistent carbs  Full code SUBJECTIVE:    Patient is a 50y old Female who presents with a chief complaint of Left flank pain (13 Sep 2020 09:29)    Currently admitted to medicine with the primary diagnosis of Pulmonary embolism       Today is hospital day 4d. This morning she is resting comfortably in bed and reports no new issues or overnight events.     PAST MEDICAL & SURGICAL HISTORY  Pyelonephritis    No significant past surgical history      SOCIAL HISTORY:  Negative for smoking/alcohol/drug use.     ALLERGIES:  No Known Allergies    MEDICATIONS:  STANDING MEDICATIONS  chlorhexidine 4% Liquid 1 Application(s) Topical <User Schedule>  dextrose 5%. 1000 milliLiter(s) IV Continuous <Continuous>  dextrose 50% Injectable 12.5 Gram(s) IV Push once  dextrose 50% Injectable 25 Gram(s) IV Push once  dextrose 50% Injectable 25 Gram(s) IV Push once  enoxaparin Injectable 60 milliGRAM(s) SubCutaneous two times a day  ferrous    sulfate 325 milliGRAM(s) Oral daily  influenza   Vaccine 0.5 milliLiter(s) IntraMuscular once  insulin glargine Injectable (LANTUS) 15 Unit(s) SubCutaneous at bedtime  insulin lispro (HumaLOG) corrective regimen sliding scale   SubCutaneous three times a day before meals  insulin lispro Injectable (HumaLOG) 5 Unit(s) SubCutaneous three times a day before meals  ketorolac   Injectable 30 milliGRAM(s) IV Push once  morphine  - Injectable 4 milliGRAM(s) IV Push once  warfarin 5 milliGRAM(s) Oral once  warfarin 7.5 milliGRAM(s) Oral at bedtime    PRN MEDICATIONS  dextrose 40% Gel 15 Gram(s) Oral once PRN  glucagon  Injectable 1 milliGRAM(s) IntraMuscular once PRN    VITALS:   T(F): 97.4  HR: 85  BP: 96/59  RR: 18  SpO2: 98%    LABS:                        9.0    6.27  )-----------( 344      ( 13 Sep 2020 06:26 )             33.5     09-13    137  |  102  |  14  ----------------------------<  277<H>  4.1   |  25  |  0.5<L>    Ca    8.9      13 Sep 2020 06:26  Mg     2.0     09-13      PT/INR - ( 13 Sep 2020 06:26 )   PT: 13.60 sec;   INR: 1.18 ratio         PTT - ( 13 Sep 2020 06:26 )  PTT:33.5 sec              RADIOLOGY:    PHYSICAL EXAM:  GEN: No acute distress  LUNGS: Clear to auscultation bilaterally   HEART: Regular  ABD: Soft, non-tender, non-distended.  EXT: NC/NC/NE/2+PP/GUTIERREZ/Skin Intact.   NEURO: AAOX3    Intravenous access:   NG tube:   Dupree Catheter:        SUBJECTIVE:    Patient is a 50y old Female who presents with a chief complaint of Left flank pain (13 Sep 2020 09:29)    Currently admitted to medicine with the primary diagnosis of Pulmonary embolism       Today is hospital day 4d. This morning she is resting comfortably in bed and reports no new issues or overnight events.     PAST MEDICAL & SURGICAL HISTORY  Pyelonephritis    No significant past surgical history      SOCIAL HISTORY:  Negative for smoking/alcohol/drug use.     ALLERGIES:  No Known Allergies    MEDICATIONS:  STANDING MEDICATIONS  chlorhexidine 4% Liquid 1 Application(s) Topical <User Schedule>  dextrose 5%. 1000 milliLiter(s) IV Continuous <Continuous>  dextrose 50% Injectable 12.5 Gram(s) IV Push once  dextrose 50% Injectable 25 Gram(s) IV Push once  dextrose 50% Injectable 25 Gram(s) IV Push once  enoxaparin Injectable 60 milliGRAM(s) SubCutaneous two times a day  ferrous    sulfate 325 milliGRAM(s) Oral daily  influenza   Vaccine 0.5 milliLiter(s) IntraMuscular once  insulin glargine Injectable (LANTUS) 15 Unit(s) SubCutaneous at bedtime  insulin lispro (HumaLOG) corrective regimen sliding scale   SubCutaneous three times a day before meals  insulin lispro Injectable (HumaLOG) 5 Unit(s) SubCutaneous three times a day before meals  ketorolac   Injectable 30 milliGRAM(s) IV Push once  morphine  - Injectable 4 milliGRAM(s) IV Push once  warfarin 5 milliGRAM(s) Oral once  warfarin 7.5 milliGRAM(s) Oral at bedtime    PRN MEDICATIONS  dextrose 40% Gel 15 Gram(s) Oral once PRN  glucagon  Injectable 1 milliGRAM(s) IntraMuscular once PRN    VITALS:   T(F): 97.4  HR: 85  BP: 96/59  RR: 18  SpO2: 98%    LABS:                        9.0    6.27  )-----------( 344      ( 13 Sep 2020 06:26 )             33.5     09-13    137  |  102  |  14  ----------------------------<  277<H>  4.1   |  25  |  0.5<L>    Ca    8.9      13 Sep 2020 06:26  Mg     2.0     09-13      PT/INR - ( 13 Sep 2020 06:26 )   PT: 13.60 sec;   INR: 1.18 ratio         PTT - ( 13 Sep 2020 06:26 )  PTT:33.5 sec              RADIOLOGY:    PHYSICAL EXAM:  GEN: No acute distress  LUNGS: Clear to auscultation bilaterally   HEART: Regular  ABD: Soft, non-tender, non-distended.  EXT: NC/NC/NE/2+PP/GUTIERREZ/Skin Intact.   NEURO: AAOX3

## 2020-09-13 NOTE — DISCHARGE NOTE PROVIDER - HOSPITAL COURSE
Eritre[place] speaking, 50F w/ PMHx of Pyelonephritis presents with c/o acute onset of Lt flank pain with radiation to Lt groin. Left flank pain started last night, sharp in quality, 10/10 on pain scale, radiating to Lt groin, improved with tylenol, and associated with Nausea and chills. Off note, she had Lt pyelonephritis on previous admission 5/20. CTAP at that time showed large fibroid mass with mass effect on bladder. No evidence of hydro or nephrolithiasis. CTAP during this admission showed acute Rt lower lobe subsegmental PE, Rectal wall thickening likely proctitis and hepatic steatosis. Flank pain resolved on admission and patient was started on Lovenox for PE with transition to coumadin in the outpatient. Patient got a transvaginal ultrasound to workup the pelvic mass. OBGYN will continue workup outpatient. Of note patient was found to be diabetic during this admission with a Hba1C of 10.3. She has not seen in a doctor in a long time because she has no insurance. She was also found to have  iron deficiency anemia for which she is receiving iron supplements.

## 2020-09-13 NOTE — DISCHARGE NOTE NURSING/CASE MANAGEMENT/SOCIAL WORK - FLU SEASON?
Encounter Date: 8/14/2018    SCRIBE #1 NOTE: I, Charlee Ocampo, am scribing for, and in the presence of,  Dr. Bentley. I have scribed the following portions of the note - the EKG reading.       History     Chief Complaint   Patient presents with    Hypertension     46 yo female w/ no known PMHx and no interaction with health care professionals recently, patient here with gradual osnet of HA 3 days ago w/o neuro sx, syncope, risk factors for intracranial mass lesions, or red flags to suggest SAH.  Also with fatigue.  Denies pain in any other location or any other symptoms.  Patient presented today because she took her BP for the first time in many years and found that it was severely elevated.          Review of patient's allergies indicates:  No Known Allergies  History reviewed. No pertinent past medical history.  History reviewed. No pertinent surgical history.  History reviewed. No pertinent family history.  Social History     Tobacco Use    Smoking status: Current Every Day Smoker     Types: Cigarettes    Smokeless tobacco: Current User   Substance Use Topics    Alcohol use: No     Frequency: Never    Drug use: No     Review of Systems   Constitutional: Positive for fatigue. Negative for activity change and unexpected weight change.   Eyes: Negative for photophobia and visual disturbance.   Respiratory: Negative for cough and shortness of breath.    Cardiovascular: Negative for chest pain.   Gastrointestinal: Negative for abdominal pain, constipation, diarrhea and vomiting.   Genitourinary: Negative for difficulty urinating, dysuria, pelvic pain, vaginal bleeding, vaginal discharge and vaginal pain.   Musculoskeletal: Negative for arthralgias, back pain and gait problem.   Neurological: Positive for headaches. Negative for dizziness, seizures, syncope, weakness and light-headedness.   All other systems reviewed and are negative.      Physical Exam     Initial Vitals [08/14/18 1702]   BP Pulse Resp Temp SpO2    (!) 219/127 (!) 111 18 97.9 °F (36.6 °C) 99 %      MAP       --         Physical Exam    Nursing note and vitals reviewed.  Constitutional: She appears well-developed and well-nourished. She is not diaphoretic. No distress.   HENT:   Head: Normocephalic and atraumatic.   Right Ear: External ear normal.   Left Ear: External ear normal.   Mouth/Throat: Oropharynx is clear and moist.   Eyes: Conjunctivae are normal. Pupils are equal, round, and reactive to light.   Neck: Normal range of motion.   No meningismus, negative jolt accentuation test   Cardiovascular: Normal rate.   Pulmonary/Chest: Breath sounds normal. No respiratory distress. She has no wheezes. She has no rales.   Abdominal: Soft. She exhibits no distension. There is no tenderness. There is no rebound and no guarding.   Musculoskeletal: Normal range of motion. She exhibits no tenderness.   Neurological: She is alert and oriented to person, place, and time. She has normal strength and normal reflexes. No cranial nerve deficit.   Normal cerebellar testing, normal gait, normal sensation   Skin: Skin is warm and dry. Capillary refill takes less than 2 seconds. No erythema.   Psychiatric: She has a normal mood and affect. Her behavior is normal. Judgment and thought content normal.         ED Course   Procedures  Labs Reviewed   COMPREHENSIVE METABOLIC PANEL - Abnormal; Notable for the following components:       Result Value    Calcium 12.2 (*)     Anion Gap 5 (*)     All other components within normal limits   CBC W/ AUTO DIFFERENTIAL - Abnormal; Notable for the following components:    MCH 31.2 (*)     All other components within normal limits   URINALYSIS, REFLEX TO URINE CULTURE - Abnormal; Notable for the following components:    Color, UA Colorless (*)     Occult Blood UA 2+ (*)     All other components within normal limits    Narrative:     Preferred Collection Type->Urine, Clean Catch   PHOSPHORUS - Abnormal; Notable for the following components:     Phosphorus 2.0 (*)     All other components within normal limits    Narrative:     ADD-ON TSH #[id number]; MG #[id number]; PHOS #[id number] PER DANGELO PERES MD 18:47  08/14/2018   ADD-ON PTH #[id number] PER DANGELO PICHARDO MD 18:50  08/14/2018    PTH, INTACT - Abnormal; Notable for the following components:    PTH, Intact 322.0 (*)     All other components within normal limits    Narrative:     ADD-ON TSH #[id number]; MG #[id number]; PHOS #[id number] PER DANGELO PERES MD 18:47  08/14/2018   ADD-ON PTH #[id number] PER DANGELO PICHARDO MD 18:50  08/14/2018    TROPONIN I   TROPONIN I   TSH   MAGNESIUM   PHOSPHORUS   PTH, INTACT   TSH    Narrative:     ADD-ON TSH #[id number]; MG #[id number]; PHOS #[id number] PER DANGELO PERES MD 18:47  08/14/2018   ADD-ON PTH #[id number] PER DANGELO PICHARDO MD 18:50  08/14/2018    MAGNESIUM    Narrative:     ADD-ON TSH #[id number]; MG #[id number]; PHOS #[id number] PER DANGELO PERES MD 18:47  08/14/2018   ADD-ON PTH #[id number] PER DANGELO PIHCARDO MD 18:50  08/14/2018    URINALYSIS MICROSCOPIC    Narrative:     Preferred Collection Type->Urine, Clean Catch   POCT URINE PREGNANCY     EKG Readings: (Independently Interpreted)   1721: rate 93 bpm, NSR, no STEMI, adequate tracing, normal intervals.       Imaging Results          CT Head Without Contrast (Final result)  Result time 08/14/18 20:07:30    Final result by Arnaud Akhtar MD (08/14/18 20:07:30)                 Impression:      No acute large vascular territory infarct or intracranial hemorrhage identified.    Chronic appearing lacunar type infarcts at the anterior limb right internal capsule and left corona radiata/basal ganglia.  Additional age-indeterminate suspected lacunar type infarcts at the right basal ganglia, left ruben and left cerebellar hemisphere.  Correlate clinically.    Periventricular white matter minimal nonspecific low-attenuation changes, likely sequela of chronic small vessel ischemic change in this patient with  atherosclerosis of the cavernous ICAs.    Electronically signed by resident: Igor Coy  Date:    08/14/2018  Time:    19:44    Electronically signed by: Arnaud Akhtar MD  Date:    08/14/2018  Time:    20:07             Narrative:    EXAMINATION:  CT HEAD WITHOUT CONTRAST    CLINICAL HISTORY:  Headache, acute, norm neuro exam;    TECHNIQUE:  Low dose axial CT images obtained throughout the head without intravenous contrast. Sagittal and coronal reconstructions were performed.    COMPARISON:  None.    FINDINGS:  Intracranial compartment:    Ventricles and sulci are normal in size for age without evidence of hydrocephalus. No extra-axial blood or fluid collections.    Low attenuation with volume loss noted at the anterior aspect of the right internal capsule and also left corona radiata extending to the left basal ganglia suggesting remote lacunar type infarcts.  Additional more subtle low-attenuation foci noted at the right caudate and lenticular nucleus, left ruben and left cerebellar hemisphere suggestive for age-indeterminate lacunar type infarcts.  Periventricular white matter minimal nonspecific low-attenuation changes.  No parenchymal mass, hemorrhage, edema or major vascular distribution infarct.  Atherosclerotic calcifications of the bilateral cavernous ICAs.    Skull/extracranial contents (limited evaluation): No fracture. Mastoid air cells and paranasal sinuses are essentially clear.                               X-Ray Chest AP Portable (Final result)  Result time 08/14/18 18:41:29    Final result by Arnaud Akhtar MD (08/14/18 18:41:29)                 Impression:      No radiographic acute intrathoracic process seen.      Electronically signed by: Arnaud Akhtar MD  Date:    08/14/2018  Time:    18:41             Narrative:    EXAMINATION:  XR CHEST AP PORTABLE    CLINICAL HISTORY:  Hypertensive urgency    TECHNIQUE:  Single frontal view of the chest was performed.    COMPARISON:  None    FINDINGS:  The  lungs are clear, with normal appearance of pulmonary vasculature and no pleural effusion or pneumothorax.    The cardiac silhouette is normal in size. The hilar and mediastinal contours are unremarkable.    Bones are intact.                              X-Rays:   Independently Interpreted Readings:   Chest X-Ray: No acute cardiopulmonary process seen.   Head CT: No ICH. Old lacunar type infarcts observed.     Medical Decision Making:   History:   Old Medical Records: I decided to obtain old medical records.  Initial Assessment:   48 yo female w/ no known PMHx and no interaction with health care professionals recently and not aware of pmhx of htn and not on any medications at home who presents with hypertensive urgency requiring IV hypertensive agents for control also found to have hypercalcemia and hyperparathyroidism.  Differential Diagnosis:   Hypertensive urgency, parathyroid tumor, lung mass secreting parathyroid related hormone, ICH  Independently Interpreted Test(s):   I have ordered and independently interpreted EKG Reading(s) - see prior notes  Clinical Tests:   Lab Tests: Ordered and Reviewed  Radiological Study: Ordered and Reviewed  Medical Tests: Ordered and Reviewed  ED Management:  Pt treated with 60 mg nifedipine followed by 10mg lisinopril.   Blood pressure did not respond.  20 mg labetalol IV given with significant blood pressure reduction to under 200 systolic. CMP showed Ca 12.2. Elevated parathyroid hormone to 322. Concerning for possible malignancy. CT head w/ old lacunar infarcts and showing bilateral ICA atherosclerotic disease indicative that this pt does have chronic medical problems despite her no knowledge of such. Spoke with medicine who admitted for control of htn requiring IV medications and for inpatient versus outpatient workup of hypercalcemia and hyperparathyroidism.      Narinder Alexander MD              Scribe Attestation:   Scribe #1: I performed the above scribed service and the  documentation accurately describes the services I performed. I attest to the accuracy of the note.    Attending Attestation:   Physician Attestation Statement for Resident:  As the supervising MD   Physician Attestation Statement: I have personally seen and examined this patient.   I agree with the above history. -: I have reviewed and concur with the resident's history, physical, assessment, and plan.  I have personally interviewed and examined the patient at bedside.  See below addendum for my evaluation and additional findings.      Here with severe HTN likely chronic refractory to PO meds and moderate hyperCa likely 2/2 hyperparathyroidism.  Will admit for [facility]    I, Dr. Michael Bentley, personally performed the services described in this documentation. All medical record entries made by the scribe were at my direction and in my presence.  I have reviewed the chart and agree that the record reflects my personal performance and is accurate and complete. Michael Bentley MD.  12:27 AM 08/15/2018     As the supervising MD I agree with the above PE.    As the supervising MD I agree with the above treatment, course, plan, and disposition.  I have reviewed and agree with the residents interpretation of the following: EKG, CT scans, x-rays and lab data.        Attending Critical Care:   Critical Care Times:   Direct Patient Care (initial evaluation, reassessments, and time considering the case)................................................................5 minutes.   Additional History from reviewing old medical records or taking additional history from the family, EMS, PCP, etc.......................2 minutes.   Ordering, Reviewing, and Interpreting Diagnostic Studies...............................................................................................................5 minutes.    Documentation..................................................................................................................................................................................10 minutes.   Consultation with the patient's family directly relating to the patient's condition, care, and DNR status (when patient unable)......0 minutes.   Other..................................................................................................................................................................................................9 minutes.   ==============================================================  · Total Critical Care Time - exclusive of procedural time: 31 minutes.  ==============================================================  Critical care was necessary to treat or prevent imminent or life-threatening deterioration of the following conditions: hypertensive urgency and metabolic crisis.       Attending ED Notes:   47yoF with unknown PMHx here with likely chronic, long-standing, unTx hyperTN and hypercalcemia.          ED Course as of Aug 14 2024   Tue Aug 14, 2018   1826 Troponin I [KP]      ED Course User Index  [KP] Narinder Alexander MD     Clinical Impression:   Diagnoses of Hypertension and Hypertensive urgency were pertinent to this visit.      Disposition:   Disposition: Admitted                        Narinder Alexander MD  Resident  08/14/18 2334       Michael Bentley MD  08/15/18 0026       Michael Bentley MD  08/15/18 0027     Yes...

## 2020-09-13 NOTE — DISCHARGE NOTE PROVIDER - NSDCCPCAREPLAN_GEN_ALL_CORE_FT
PRINCIPAL DISCHARGE DIAGNOSIS  Diagnosis: Pulmonary embolism  Assessment and Plan of Treatment: During this hospitalization, you were diagnosed with a pulmonary embolsim. In your case, you have a  deep vein thrombosis (DVT) which is a blood clot in a large vein deep in a leg, arm, or elsewhere in the body. The clot can separate from the vein, travel to the lungs and cut off blood flow. This is a pulmonary embolism (PE). Pulmonary embolism is very serious. Both the prevention and the treatment are similar for DVT and PE.   To help prevent more blood clots from forming, please see your primary care doctor within one week of discharge, and please take your medicines exactly as instructed. Don’t skip doses. You have been prescribed a medication to thin the blood, so that more clots do not form.  Have all lab tests as recommended. This is very important when you take medicines to prevent blood clots. Make sure you stay active and walk for at least 30 minutes every day. When sitting for long periods of time, move your knees, ankles, feet, and toes. Please make sure you follow up at the coumadin clinic to monitor your INR levels.   If you smoke, get help to quit. Stay at a healthy weight. Try to exercise at least 30 minutes on most days. Before starting an exercise program, talk with your primary care provider. When traveling by car, make frequent stops to get up and move around. On long airplane rides, get up and move around when possible. If you can’t get up, wiggle your toes, move your ankles and tighten your calves to keep your blood moving.  Seek immediate medical care if you have pain, swelling, and redness in your leg, arm, or other body area. These symptoms may mean another blood clot. Also call your healthcare provider if you have signs and symptoms of bleeding, like blood in your urine, bleeding with bowel movements, or bleeding from the nose, gums, a cut, or vagina. Call 911 if you have symptoms of a blood clot in the lungs including: Chest pain, trouble breathing, coughing blood, fast heartbeat, heavy or uncontrolled bleeding.      SECONDARY DISCHARGE DIAGNOSES  Diagnosis: Newly diagnosed diabetes  Assessment and Plan of Treatment: Diabetes is a chronic condition caused by high blood sugar levels. Your blood sugar levels become high because your body does not have enough insulin. Insulin helps move sugar out of the blood so it can be used for energy. Increased sugar in your blood can cause problems in several organs of your body including but not limited to your blood vessels, your kidneys, your brain, and your eyes. The lack of insulin forces your body to use fat instead of sugar for energy. This can be dangerous if not controlled.  Seek Medical Attention If:  You have a seizure.  You begin to breathe fast, or are short of breath.  You become weak and confused.  You are more drowsy than usual.  You have fruity, sweet breath.  You have severe, new stomach pain and are vomiting.  Your blood sugar level is lower or higher than your healthcare provider says it should be.  You have ketones in your blood or urine.  You have a fever or chills.  You are more thirsty than usual.  You are urinating more often than usual.  You have questions or concerns about your condition or care.  Insulin and diabetes medicine decreases the amount of sugar in your blood.  The best way to prevent problems from Diabetes is to control your blood sugars.  Please take your metformin as prescribed and follow up with the endocrinologist for your diabtes managment.  Speak with your doctor and/or a nutritionist about the best way to change your lifestyle and dietary habits to avoid any problems from Diabetes in the future.    Diagnosis: Iron deficiency anemia  Assessment and Plan of Treatment: Anemia is a condition in which the concentration of red blood cells or hemoglobin in the blood is below normal. Hemoglobin is a substance in red blood cells that carries oxygen to the tissues of the body. Anemia results in not enough oxygen reaching these tissues which can cause symptoms such as weakness, dizziness/lightheadedness, shortness of breath, chest pain, paleness, or nausea. The cause of your anemia was determined to be iron deficiency. You should follow up with your PCP in 1 week to monitor your hemoglobin levels   SEEK IMMEDIATE MEDICAL CARE IF YOU HAVE ANY OF THE FOLLOWING SYMPTOMS: extreme weakness/chest pain/shortness of breath, black or bloody stools, vomiting blood, fainting, fever, or any signs of dehydration.    Diagnosis: Uterine mass  Assessment and Plan of Treatment: You were found to have a uterine mass on admission. Obn saw you and wants you to follow up outpatient. Please see them for further workup of your mass.

## 2020-09-13 NOTE — DISCHARGE NOTE PROVIDER - CARE PROVIDERS DIRECT ADDRESSES
,DirectAddress_Unknown,[email or web address]

## 2020-09-15 LAB — PROT S FREE PPP-ACNC: 81 % NORMAL — SIGNIFICANT CHANGE UP (ref 60–140)

## 2021-02-08 PROBLEM — Z00.00 ENCOUNTER FOR PREVENTIVE HEALTH EXAMINATION: Status: ACTIVE | Noted: 2021-02-08

## 2021-02-08 PROBLEM — N12 TUBULO-INTERSTITIAL NEPHRITIS, NOT SPECIFIED AS ACUTE OR CHRONIC: Chronic | Status: ACTIVE | Noted: 2020-09-09

## 2021-02-26 ENCOUNTER — OUTPATIENT (OUTPATIENT)
Dept: OUTPATIENT SERVICES | Facility: HOSPITAL | Age: 51
LOS: 1 days | Discharge: HOME | End: 2021-02-26

## 2021-02-26 ENCOUNTER — APPOINTMENT (OUTPATIENT)
Dept: GASTROENTEROLOGY | Facility: CLINIC | Age: 51
End: 2021-02-26
Payer: COMMERCIAL

## 2021-02-26 VITALS
TEMPERATURE: 97.7 F | HEIGHT: 60 IN | HEART RATE: 76 BPM | DIASTOLIC BLOOD PRESSURE: 71 MMHG | SYSTOLIC BLOOD PRESSURE: 108 MMHG | BODY MASS INDEX: 24.54 KG/M2 | OXYGEN SATURATION: 99 % | WEIGHT: 125 LBS

## 2021-02-26 DIAGNOSIS — Z78.9 OTHER SPECIFIED HEALTH STATUS: ICD-10-CM

## 2021-02-26 DIAGNOSIS — I26.99 OTHER PULMONARY EMBOLISM W/OUT ACUTE COR PULMONALE: ICD-10-CM

## 2021-02-26 DIAGNOSIS — D50.9 IRON DEFICIENCY ANEMIA, UNSPECIFIED: ICD-10-CM

## 2021-02-26 DIAGNOSIS — K76.0 FATTY (CHANGE OF) LIVER, NOT ELSEWHERE CLASSIFIED: ICD-10-CM

## 2021-02-26 DIAGNOSIS — Z83.3 FAMILY HISTORY OF DIABETES MELLITUS: ICD-10-CM

## 2021-02-26 PROCEDURE — 99204 OFFICE O/P NEW MOD 45 MIN: CPT

## 2021-02-26 RX ORDER — METFORMIN HYDROCHLORIDE 625 MG/1
TABLET ORAL
Refills: 0 | Status: ACTIVE | COMMUNITY

## 2021-02-26 RX ORDER — WARFARIN SODIUM 6 MG/1
TABLET ORAL
Refills: 0 | Status: ACTIVE | COMMUNITY

## 2021-02-26 NOTE — HISTORY OF PRESENT ILLNESS
[de-identified] : a 49 yo lady Bengali speaking, with hx of DM, subsegmental PE in september 2020 on coumadin was refrred for LUIS FELIPE\par patient denies abdominal pain, nausea vomiting melena blood in stool\par she has regular menstrual period, not heavy for 4 days

## 2021-02-26 NOTE — PHYSICAL EXAM
[General Appearance - Alert] : alert [General Appearance - In No Acute Distress] : in no acute distress [Sclera] : the sclera and conjunctiva were normal [PERRL With Normal Accommodation] : pupils were equal in size, round, and reactive to light [Neck Appearance] : the appearance of the neck was normal [Auscultation Breath Sounds / Voice Sounds] : lungs were clear to auscultation bilaterally [Apical Impulse] : the apical impulse was normal [Heart Sounds] : normal S1 and S2 [Edema] : there was no peripheral edema [Bowel Sounds] : normal bowel sounds [Abdomen Soft] : soft [Abdomen Tenderness] : non-tender [No CVA Tenderness] : no ~M costovertebral angle tenderness [Abnormal Walk] : normal gait [Skin Turgor] : normal skin turgor [Oriented To Time, Place, And Person] : oriented to person, place, and time [FreeTextEntry1] : decrease hearing

## 2021-02-26 NOTE — END OF VISIT
[] : Fellow [FreeTextEntry3] : LUIS FELIPE - will need EGD and colonoscopy, but need guidance from pulmonary regarding coumadin. hepatic steatosis, but normal LFTS, counseled on weight loss and lifestyle modifications

## 2021-02-26 NOTE — REVIEW OF SYSTEMS
[Fever] : no fever [Chills] : no chills [Eye Pain] : no eye pain [Red Eyes] : eyes not red [Earache] : no earache [Loss Of Hearing] : no hearing loss [Chest Pain] : no chest pain [Palpitations] : no palpitations [Cough] : no cough [SOB on Exertion] : no shortness of breath during exertion [Pelvic Pain] : no pelvic pain [Itching] : no itching [Dizziness] : no dizziness

## 2021-02-26 NOTE — ASSESSMENT
[FreeTextEntry1] : *LUIS FELIPE\par -repeat CBC, iron studies\par -c/w iron studies\par -need EGD and colonoscopy +/- capsule after pulmonary evaluation as patient has recent PE and is currently on coumadin. will give referral within 1 month \par -to note that on the CT that patient had Questionable rectal wall thickening but is asymptomatic\par \par *hepatic steatosis\par -no alcohol intake\par -BMI 24, central obesity, DM\par -normal LFts\par -hep C Ab negative\par -check hep A and B immunity status and give vaccine if needed\par -lifestyle modifications

## 2021-03-01 DIAGNOSIS — K76.0 FATTY (CHANGE OF) LIVER, NOT ELSEWHERE CLASSIFIED: ICD-10-CM

## 2021-03-01 DIAGNOSIS — D50.9 IRON DEFICIENCY ANEMIA, UNSPECIFIED: ICD-10-CM

## 2021-04-09 ENCOUNTER — APPOINTMENT (OUTPATIENT)
Dept: PULMONOLOGY | Facility: CLINIC | Age: 51
End: 2021-04-09

## 2021-04-16 ENCOUNTER — APPOINTMENT (OUTPATIENT)
Dept: GASTROENTEROLOGY | Facility: CLINIC | Age: 51
End: 2021-04-16

## 2021-07-14 ENCOUNTER — APPOINTMENT (OUTPATIENT)
Dept: OTOLARYNGOLOGY | Facility: CLINIC | Age: 51
End: 2021-07-14

## 2021-07-15 NOTE — ED ADULT NURSE NOTE - NS ED NURSE DISCH DISPOSITION
If you are a smoker, it is important for your health to stop smoking. Please be aware that second hand smoke is also harmful.
Discharged

## 2021-07-28 ENCOUNTER — APPOINTMENT (OUTPATIENT)
Dept: VASCULAR SURGERY | Facility: CLINIC | Age: 51
End: 2021-07-28

## 2021-08-20 ENCOUNTER — APPOINTMENT (OUTPATIENT)
Dept: GASTROENTEROLOGY | Facility: CLINIC | Age: 51
End: 2021-08-20

## 2022-03-16 NOTE — ED ADULT NURSE NOTE - DOES PATIENT HAVE ADVANCE DIRECTIVE
Detail Level: Zone Render In Strict Bullet Format?: No Plan: Extend 20 mg prednisone x 2 additional weeks as pt is improving, then plan to decrease again to 10 mg daily\\nASO improved from initial labs\\nComplete doxy as prescribed for + MSSA on culture of ulcer\\nContinue compression stockings and leg elevation\\nKeep appt with rheum for labs and XR for further evaluation, has follow up in 4 weeks, pending rheum recommendations at this time\\Eloisa questions answered, eval in 2 weeks Modify Regimen: prednisone 10 mg tablet QD\\nQuantity: 14.0 Tablet\\nSig: Take 2 pills PO QD x2 additional weeks Continue Regimen: Doxycycline 100 mg BID- complete 2 week course as rx for + MSSA on culture\\nColchicine as rx by PCP\\nMupirocin TID x 2 weeks No

## 2022-04-08 ENCOUNTER — APPOINTMENT (OUTPATIENT)
Dept: PULMONOLOGY | Facility: CLINIC | Age: 52
End: 2022-04-08

## 2022-06-03 NOTE — ED ADULT NURSE NOTE - SUICIDE SCREENING DEPRESSION
Negative

## 2023-09-17 NOTE — ED PROVIDER NOTE - NS ED ATTENDING STATEMENT MOD
I have personally seen and examined this patient.  I have fully participated in the care of this patient. I have reviewed all pertinent clinical information, including history, physical exam, plan and the Resident’s note and agree except as noted. 79

## 2025-04-25 NOTE — ED ADULT NURSE NOTE - NSIMPLEMENTINTERV_GEN_ALL_ED
Implemented All Universal Safety Interventions:  [place] to call system. Call bell, personal items and telephone within reach. Instruct patient to call for assistance. Room bathroom lighting operational. Non-slip footwear when patient is off stretcher. Physically safe environment: no spills, clutter or unnecessary equipment. Stretcher in lowest position, wheels locked, appropriate side rails in place. bed rails

## 2025-04-30 ENCOUNTER — APPOINTMENT (OUTPATIENT)
Dept: OTOLARYNGOLOGY | Facility: CLINIC | Age: 55
End: 2025-04-30
Payer: MEDICAID

## 2025-04-30 VITALS — BODY MASS INDEX: 23.36 KG/M2 | WEIGHT: 119 LBS | HEIGHT: 60 IN

## 2025-04-30 DIAGNOSIS — H90.6 MIXED CONDUCTIVE AND SENSORINEURAL HEARING LOSS, BILATERAL: ICD-10-CM

## 2025-04-30 DIAGNOSIS — H66.93 OTITIS MEDIA, UNSPECIFIED, BILATERAL: ICD-10-CM

## 2025-04-30 DIAGNOSIS — H72.91 UNSPECIFIED PERFORATION OF TYMPANIC MEMBRANE, RIGHT EAR: ICD-10-CM

## 2025-04-30 DIAGNOSIS — H72.92 UNSPECIFIED PERFORATION OF TYMPANIC MEMBRANE, LEFT EAR: ICD-10-CM

## 2025-04-30 PROCEDURE — 99204 OFFICE O/P NEW MOD 45 MIN: CPT

## 2025-04-30 PROCEDURE — 92567 TYMPANOMETRY: CPT

## 2025-04-30 PROCEDURE — 92557 COMPREHENSIVE HEARING TEST: CPT

## 2025-04-30 RX ORDER — FLUTICASONE PROPIONATE 50 UG/1
50 SPRAY, METERED NASAL TWICE DAILY
Qty: 1 | Refills: 4 | Status: ACTIVE | COMMUNITY
Start: 2025-04-30 | End: 1900-01-01

## 2025-04-30 RX ORDER — LEVOCETIRIZINE DIHYDROCHLORIDE 5 MG/1
5 TABLET ORAL DAILY
Qty: 30 | Refills: 5 | Status: ACTIVE | COMMUNITY
Start: 2025-04-30 | End: 1900-01-01

## 2025-05-15 ENCOUNTER — APPOINTMENT (OUTPATIENT)
Dept: OTOLARYNGOLOGY | Facility: CLINIC | Age: 55
End: 2025-05-15
Payer: MEDICAID

## 2025-05-15 PROCEDURE — V5010 ASSESSMENT FOR HEARING AID: CPT

## 2025-06-03 ENCOUNTER — APPOINTMENT (OUTPATIENT)
Dept: OTOLARYNGOLOGY | Facility: CLINIC | Age: 55
End: 2025-06-03
Payer: COMMERCIAL

## 2025-06-03 PROCEDURE — V5261J: CUSTOM

## 2025-07-03 ENCOUNTER — APPOINTMENT (OUTPATIENT)
Dept: OTOLARYNGOLOGY | Facility: CLINIC | Age: 55
End: 2025-07-03

## 2025-07-11 NOTE — ED PROVIDER NOTE - EKG #1 DATE/TIME
Spoke to Kya and advised:    Increase current regimen to 2.5 mg Wednesday and 5 mg all other days.    Kya (next to kin) verbalized understanding and denied any signs of thrombosis, bleeding, recent illness, + diet/exercise changes.   
10-May-2020 15:16

## 2025-08-01 ENCOUNTER — APPOINTMENT (OUTPATIENT)
Dept: OTOLARYNGOLOGY | Facility: CLINIC | Age: 55
End: 2025-08-01
Payer: COMMERCIAL

## 2025-08-01 VITALS — BODY MASS INDEX: 23.36 KG/M2 | WEIGHT: 119 LBS | HEIGHT: 60 IN

## 2025-08-01 DIAGNOSIS — H90.6 MIXED CONDUCTIVE AND SENSORINEURAL HEARING LOSS, BILATERAL: ICD-10-CM

## 2025-08-01 DIAGNOSIS — H72.92 UNSPECIFIED PERFORATION OF TYMPANIC MEMBRANE, LEFT EAR: ICD-10-CM

## 2025-08-01 DIAGNOSIS — H72.91 UNSPECIFIED PERFORATION OF TYMPANIC MEMBRANE, RIGHT EAR: ICD-10-CM

## 2025-08-01 DIAGNOSIS — H66.93 OTITIS MEDIA, UNSPECIFIED, BILATERAL: ICD-10-CM

## 2025-08-01 PROCEDURE — G2211 COMPLEX E/M VISIT ADD ON: CPT | Mod: NC

## 2025-08-01 PROCEDURE — 99214 OFFICE O/P EST MOD 30 MIN: CPT

## 2025-08-01 PROCEDURE — 92593: CPT
